# Patient Record
Sex: MALE | Race: BLACK OR AFRICAN AMERICAN
[De-identification: names, ages, dates, MRNs, and addresses within clinical notes are randomized per-mention and may not be internally consistent; named-entity substitution may affect disease eponyms.]

---

## 2020-12-19 ENCOUNTER — HOSPITAL ENCOUNTER (EMERGENCY)
Dept: HOSPITAL 75 - ER | Age: 45
Discharge: HOME | End: 2020-12-19
Payer: SELF-PAY

## 2020-12-19 VITALS — HEIGHT: 68.9 IN | BODY MASS INDEX: 46.65 KG/M2 | WEIGHT: 315 LBS

## 2020-12-19 VITALS — SYSTOLIC BLOOD PRESSURE: 139 MMHG | DIASTOLIC BLOOD PRESSURE: 67 MMHG

## 2020-12-19 DIAGNOSIS — I10: ICD-10-CM

## 2020-12-19 DIAGNOSIS — F17.200: ICD-10-CM

## 2020-12-19 DIAGNOSIS — N50.89: Primary | ICD-10-CM

## 2020-12-19 DIAGNOSIS — E66.9: ICD-10-CM

## 2020-12-19 LAB
APTT PPP: YELLOW S
BACTERIA #/AREA URNS HPF: NEGATIVE /HPF
BILIRUB UR QL STRIP: NEGATIVE
FIBRINOGEN PPP-MCNC: CLEAR MG/DL
GLUCOSE UR STRIP-MCNC: NEGATIVE MG/DL
KETONES UR QL STRIP: NEGATIVE
LEUKOCYTE ESTERASE UR QL STRIP: NEGATIVE
NITRITE UR QL STRIP: NEGATIVE
PH UR STRIP: 6.5 [PH] (ref 5–9)
PROT UR QL STRIP: NEGATIVE
RBC #/AREA URNS HPF: (no result) /HPF
SP GR UR STRIP: 1.02 (ref 1.02–1.02)
SQUAMOUS #/AREA URNS HPF: (no result) /HPF
WBC #/AREA URNS HPF: (no result) /HPF

## 2020-12-19 PROCEDURE — 99284 EMERGENCY DEPT VISIT MOD MDM: CPT

## 2020-12-19 PROCEDURE — 81000 URINALYSIS NONAUTO W/SCOPE: CPT

## 2020-12-19 PROCEDURE — 86780 TREPONEMA PALLIDUM: CPT

## 2020-12-19 PROCEDURE — 87491 CHLMYD TRACH DNA AMP PROBE: CPT

## 2020-12-19 PROCEDURE — 36415 COLL VENOUS BLD VENIPUNCTURE: CPT

## 2020-12-19 PROCEDURE — 87591 N.GONORRHOEAE DNA AMP PROB: CPT

## 2020-12-19 NOTE — ED GU-MALE
General


Chief Complaint:  Cough/Cold/Flu Symptoms


Stated Complaint:  COUGH


Source:  patient


Exam Limitations:  no limitations





History of Present Illness


Date Seen by Provider:  Dec 19, 2020


Time Seen by Provider:  14:40


Initial Comments


The patient presents to the ER by private conveyance with chief complaint that 

he woke up yesterday with a right swollen testicle.  He says is happened once 

before with trauma when he was a child.  He denies ever having STDs.  No dysuria

discharge or lesions.  No fevers chills shortness of air nausea vomiting loss of

sense of taste or smell, diarrhea constipation or abdominal pain.  No sick 

contacts.  He just got out of [facility] a couple days ago.  He does have a history 

of hypertension for which he is on 4 different blood pressure medicines.  He 

remembers lisinopril and amlodipine.  He has not establish care with a primary 

care physician yet.  He is sexually active although he says has been a minute.  

He prefers the company of females.  He does claim to have an occasional dry 

nonproductive cough that he attributes to smoking it is no worse than normal.





Allergies and Home Medications


Allergies


Coded Allergies:  


     No Known Drug Allergies (Unverified , 12/19/20)





Home Medications


Amlodipine Besylate 5 Mg Tablet, 5 MG PO DAILY


   Prescribed by: WILLIAM PALENCIA on 12/19/20 1522


Doxycycline Hyclate 100 Mg Tablet, 100 MG PO BID


   Prescribed by: WILLIAM PALENCIA on 12/19/20 1522


Hydrocodone/Acetaminophen 1 Each Tablet, 1 EACH PO Q6H PRN for PAIN-BREAKTHROUGH


   Prescribed by: WILLIAM PALENCIA on 12/19/20 1523





Patient Home Medication List


Home Medication List Reviewed:  Yes





Review of Systems


Review of Systems


Constitutional:  No chills, No diaphoresis


EENTM:  No ear pain, No eye pain


Respiratory:  No cough, No short of breath


Cardiovascular:  see HPI; No edema, No Hx of Intervention, No palpitations


Gastrointestinal:  No abdominal pain, No nausea, No vomiting


Genitourinary:  see HPI; denies burning, denies discharge, denies dysuria; pain 

(r teste)





All Other Systemes Reviewed


Negative Unless Noted:  Yes





Past Medical-Social-Family Hx


Patient Social History


Alcohol Use:  Denies Use


Recreational Drug Use:  No


Smoking Status:  Current Everyday Smoker





Physical Exam


Vital Signs





Vital Signs - First Documented








 12/19/20





 15:19


 


Temp 36.7


 


Pulse 96


 


Resp 14


 


B/P (MAP) 139/67 (91)


 


Pulse Ox 98


 


O2 Delivery Room Air





Capillary Refill :


Height, Weight, BMI


Height: '"


Weight: lbs. oz. kg;  BMI


Method:


General Appearance:  no apparent distress, obese


HEENT:  PERRL/EOMI, pharynx normal


Neck:  non-tender, full range of motion


Cardiovascular:  normal peripheral pulses, regular rate, rhythm


Respiratory:  lungs clear, normal breath sounds, no respiratory distress, no 

accessory muscle use


Gastrointestinal:  non tender, soft


Male:  testicular tenderness (Right enlarged approximately the size of a 

orange), other (No lesions, discharge, tenderness in the left testicle or 

penis.)


Neurologic/Psychiatric:  alert, normal mood/affect, oriented x 3


Skin:  normal color, warm/dry





Progress/Results/Core Measures


Suspected Sepsis


SIRS


Temperature: 


Pulse:  


Respiratory Rate: 


 


Blood Pressure  / 


Mean:





Results/Orders


Lab Results





Laboratory Tests








Test


 12/19/20


14:53 12/19/20


15:02 Range/Units


 


 


Urine Color YELLOW    


 


Urine Clarity CLEAR    


 


Urine pH 6.5   5-9  


 


Urine Specific Gravity 1.020   1.016-1.022  


 


Urine Protein NEGATIVE   NEGATIVE  


 


Urine Glucose (UA) NEGATIVE   NEGATIVE  


 


Urine Ketones NEGATIVE   NEGATIVE  


 


Urine Nitrite NEGATIVE   NEGATIVE  


 


Urine Bilirubin NEGATIVE   NEGATIVE  


 


Urine Urobilinogen 0.2   < = 1.0  MG/DL


 


Urine Leukocyte Esterase NEGATIVE   NEGATIVE  


 


Urine RBC (Auto) NEGATIVE   NEGATIVE  


 


Urine RBC NONE    /HPF


 


Urine WBC NONE    /HPF


 


Urine Squamous Epithelial


Cells 5-10 


 


  /HPF





 


Urine Crystals NONE    /LPF


 


Urine Bacteria NEGATIVE    /HPF


 


Urine Casts NONE    /LPF


 


Urine Mucus NEGATIVE    /LPF


 


Urine Culture Indicated NO    








My Orders





Orders - WILLIAM PALENCIA


Ceftriaxone For Im Use (Rocephin For Im (12/19/20 15:00)


Azithromycin Tablet (Zithromax Tablet) (12/19/20 15:00)


Lidocaine 1% Inj 20 Ml (Xylocaine 1% Inj (12/19/20 15:00)


Chlamydia Trachomatis Urine (12/19/20 14:59)


Neis Hemant Dna Urine Test (12/19/20 14:59)


Ua Culture If Indicated (12/19/20 14:59)


Syphilis Antibody Screen (12/19/20 15:02)





Medications Given in ED





Current Medications








 Medications  Dose


 Ordered  Sig/Luis


 Route  Start Time


 Stop Time Status Last Admin


Dose Admin


 


 Azithromycin  1,000 mg  ONCE  ONCE


 PO  12/19/20 15:00


 12/19/20 15:01 DC 12/19/20 15:57


1,000 MG


 


 Ceftriaxone Sodium  500 mg  ONCE  ONCE


 IM  12/19/20 15:00


 12/19/20 15:01 DC 12/19/20 15:57


500 MG


 


 Lidocaine HCl  1 ml  ONCE  ONCE


 INJ  12/19/20 15:00


 12/19/20 15:01 DC 12/19/20 15:57


1 ML








Vital Signs/I&O











 12/19/20





 15:19


 


Temp 36.7


 


Pulse 96


 


Resp 14


 


B/P (MAP) 139/67 (91)


 


Pulse Ox 98


 


O2 Delivery Room Air





Capillary Refill :


Progress Note :  


   Time:  15:09


Progress Note


We offered a COVID-19 swab and he declined.  He does not seem to be having any 

acute respiratory issues at this time.  We will obtain urine, STD testing and 

give him a dose of Rocephin, azithromycin and put him out on doxycycline for 

possible orchitis.  We offered to transfer him to a facility that had ultrasound

available today and encouraged him to go but he declined.  We will provide him 

with a opportunity to get an ultrasound Monday outpatient and he is okay with 

that plan.  We also offered to refill his amlodipine although initial blood 

pressure of 130s over 60s is acceptable control.  We will give him a list of the

local physicians.





Departure


Impression





   Primary Impression:  


   Swollen testicle


   Additional Impression:  


   Hypertension


   Qualified Codes:  I10 - Essential (primary) hypertension


Disposition:  01 HOME, SELF-CARE


Condition:  Stable





Departure-Patient Inst.


Decision time for Depature:  16:10


Referrals:  


NO,LOCAL PHYSICIAN (PCP/Family)


Primary Care Physician


Patient Instructions:  Epididymitis (DC), LOCAL PHYSICIAN LIST





Add. Discharge Instructions:  


Drink plenty of fluids.


Warm moist heat may be helpful.


Tylenol 1000 mg every 8 hours as necessary for pain.


Ibuprofen 800 mg every 8 hours as necessary for pain.


Hydrocodone 1 tablet every 6 hours as necessary for severe pain.


Hydrocodone will cause constipation and drowsiness.


Doxycycline 1 tablet twice a day for the next 10 days.


It is imperative that you call Monday and schedule an ultrasound of the 

testicle.


Return to the ER if you are having intractable pain, fever or nausea.


Over the next 2 to 3 days someone will call you if your lab tests are positive. 

Continue taking the antibiotics.


All discharge instructions reviewed with patient and/or family. Voiced 

understanding.


Scripts


Doxycycline Hyclate (Doxycycline Hyclate) 100 Mg Tablet


100 MG PO BID for 10 Days, #20 TAB 0 Refills


   Prov: WILLIAM PALENCIA         12/19/20 


Amlodipine Besylate (Amlodipine Besylate) 5 Mg Tablet


5 MG PO DAILY for 14 Days, #14 TAB 0 Refills


   Prov: WILLIAM PALENCIA         12/19/20 


Hydrocodone/Acetaminophen (Hydrocodone-Acetamin 5-325 mg) 1 Each Tablet


1 EACH PO Q6H PRN for PAIN-BREAKTHROUGH, #10 TAB 0 Refills


   Prov: WILLIAM PALENCIA         12/19/20











WILLIAM PALENCIA                 Dec 19, 2020 15:11

## 2020-12-21 ENCOUNTER — HOSPITAL ENCOUNTER (OUTPATIENT)
Dept: HOSPITAL 75 - RAD | Age: 45
End: 2020-12-21
Attending: EMERGENCY MEDICINE
Payer: SELF-PAY

## 2020-12-21 DIAGNOSIS — N50.89: ICD-10-CM

## 2020-12-21 DIAGNOSIS — N43.3: Primary | ICD-10-CM

## 2020-12-21 PROCEDURE — 76870 US EXAM SCROTUM: CPT

## 2020-12-21 NOTE — DIAGNOSTIC IMAGING REPORT
PROCEDURE: US Scrotum w/ Duplex



TECHNIQUE: Multiple realtime gray images were obtained of the

scrotum in various projections bilaterally. Color Doppler images

were also obtained.



INDICATION: Right testicular swelling.



COMPARISON: None.



FINDINGS: The testicles are normal in size, shape and

echogenicity. The right testis measures 4.0 x 2.6 x 2.7 cm. The

left testis measures 4.6 x 2.1 x 2.4 cm. There is normal color

flow Doppler signal of both testicles. No focal testicular mass

is seen on either side. The right and left epididymides are

unremarkable.  There is no sonographic evidence of epididymitis.

A large hydrocele is seen on the right.



IMPRESSION:

1. Large right-sided hydrocele, likely representing the patient's

history of right testicular swelling.

2. Unremarkable sonographic appearance of the testicles. No

evidence of focal mass or torsion.



Dictated by: 



  Dictated on workstation # [id number]

## 2020-12-24 ENCOUNTER — HOSPITAL ENCOUNTER (EMERGENCY)
Dept: HOSPITAL 75 - ER | Age: 45
Discharge: HOME | End: 2020-12-24
Payer: SELF-PAY

## 2020-12-24 VITALS — SYSTOLIC BLOOD PRESSURE: 185 MMHG | DIASTOLIC BLOOD PRESSURE: 102 MMHG

## 2020-12-24 VITALS — HEIGHT: 69.29 IN | BODY MASS INDEX: 46.13 KG/M2 | WEIGHT: 315 LBS

## 2020-12-24 DIAGNOSIS — I10: ICD-10-CM

## 2020-12-24 DIAGNOSIS — N43.3: Primary | ICD-10-CM

## 2020-12-24 PROCEDURE — 99281 EMR DPT VST MAYX REQ PHY/QHP: CPT

## 2020-12-24 NOTE — ED GU-MALE
General


Stated Complaint:  TESTICULAR SWELLING


Source:  patient


Exam Limitations:  no limitations





History of Present Illness


Date Seen by Provider:  Dec 24, 2020


Time Seen by Provider:  14:53


Initial Comments


Patient is a 45-year-old male who presents to the emergency department today 

with a chief complaint of significant scrotal swelling.  Patient was seen here 

in the emergency department 1 week ago and had an outpatient ultrasound 

performed on 21 December.  This ultrasound showed a right-sided hydrocele very 

large with Doppler signal to both testicles no evidence of testicular mass and 

normal epididymides bilaterally.  This hydrocele is primarily right-sided.  

Patient states that he is not really having significant worsening of his pain 

but it is quite uncomfortable.  He states he has been wearing briefs style 

underwear.  He is not been taking any NSAIDs for discomfort.  Patient states he 

has been using ice to the groin.  He is currently finishing up a prescription of

doxycycline.





No other complaints of illness or injury.





All other review of systems reviewed and negative except as stated.


Timing/Duration:  getting worse


Severity/Quality:  moderate


Location:  scrotal


Radiation:  none


Activities at Onset:  none


Prior Genitourinary Problems:  none


Sexual [place] History:  not active


Associated Symptoms:  denies symptoms





Allergies and Home Medications


Allergies


Coded Allergies:  


     No Known Drug Allergies (Unverified , 12/19/20)





Home Medications


Amlodipine Besylate 5 Mg Tablet, 5 MG PO DAILY


   Prescribed by: WILLIAM PALENCIA on 12/19/20 1522


Doxycycline Hyclate 100 Mg Tablet, 100 MG PO BID


   Prescribed by: WILLIAM PALENCIA on 12/19/20 1522


Hydrocodone/Acetaminophen 1 Each Tablet, 1 EACH PO Q6H PRN for PAIN-BREAKTHROUGH


   Prescribed by: WILLIAM PALENCIA on 12/19/20 1523





Patient Home Medication List


Home Medication List Reviewed:  Yes





Review of Systems


Review of Systems


Constitutional:  see HPI


EENTM:  no symptoms reported


Respiratory:  no symptoms reported


Cardiovascular:  no symptoms reported


Gastrointestinal:  no symptoms reported


Genitourinary:  other (Scrotal swelling)


Musculoskeletal:  no symptoms reported


Skin:  no symptoms reported





Past Medical-Social-Family Hx


Patient Social History


Type Used:  Cigarettes


Recent Foreign Travel:  No


Contact w/Someone Who Travel:  No


Recent Hopitalizations:  No





Seasonal Allergies


Seasonal Allergies:  No





Past Medical History


Surgeries:  No


Respiratory:  No


Cardiac:  Yes


Hypertension


Neurological:  No


Genitourinary:  No


Gastrointestinal:  No


Musculoskeletal:  No


Endocrine:  No


HEENT:  No


Cancer:  No


Psychosocial:  No


Integumentary:  No


Blood Disorders:  No





Physical Exam


Vital Signs


Capillary Refill :


Height, Weight, BMI


Height: '"


Weight: lbs. oz. kg; 65.00 BMI


Method:


General Appearance:  WD/WN, no apparent distress


Cardiovascular:  regular rate, rhythm


Respiratory:  lungs clear, normal breath sounds


Gastrointestinal:  non tender, soft


Male:  normal genitalia, other (Significantly swollen scrotum, it is difficult 

to palpate either testes secondary to the amount of edema present; no 

significant tenderness on palpation)


Extremities:  non-tender, normal inspection, no pedal edema


Neurologic/Psychiatric:  alert, normal mood/affect


Skin:  normal color, warm/dry





Progress/Results/Core Measures


Suspected Sepsis


SIRS


Temperature: 


Pulse:  


Respiratory Rate: 


 


Blood Pressure  / 


Mean:





Results/Orders


Vital Signs/I&O


Capillary Refill :


Progress Note :  


   Time:  15:10


Progress Note


Discussed with Dr. Tawill; he recommends another ultrasound to further evaluate 

the testicles; he did say that I could offer aspiration of the hydrocele to the 

patient.  The patient respectfully declined.  He also does not want to be sent 

to another facility for an testicular ultrasound today.  He prefers to wait for 

another couple of days and see what happens.  The patient has not been on NSAIDs

I recommended that he start taking these.  I am going to give him contact 

information for urology for Monday to call for an appointment.  The patient is 

comfortable with this plan of care.  I told him if things worsen over the course

of the next 24 to 48 hours to please come back to the emergency room or make his

way to [place] to either [facility] or [place] as they have 

ultrasound 24 hours a day.  He verbalizes understanding of this, all questions 

are sought and answered.  Patient is stable for discharge.





Departure


Communication (Admissions)


Time/Spoke to Consulting Phy:  15:00


Discussed with Dr. Tawill; will see as an outpatient





Impression





   Primary Impression:  


   Hydrocele in adult


Disposition:  01 HOME, SELF-CARE


Condition:  Stable (ERASED)





Departure-Patient Inst.


Decision time for Depature:  15:09


Referrals:  


NO,LOCAL PHYSICIAN (PCP)


Primary Care Physician








TAWIL,ELIAS A MD


Patient Instructions:  Hydrocele





Add. Discharge Instructions:  


Take over-the-counter Aleve, 2 pills in the morning with food and 2 pills at 

night with food.  Continue to wear tight briefs underwear.





You can apply ice packs for 20 minutes at a time 3-4 times a day as well.





Finish off your antibiotics as prescribed.





I have given you an outpatient order for a repeat ultrasound on Monday here at 

this facility.  But should you have any significant worsening over the next few 

days please come back to the emergency room for reevaluation and transfer to a 

center that has ultrasound capabilities and urology.











NATASHA TRAMMELL MD         Dec 24, 2020 15:00

## 2022-11-27 ENCOUNTER — HOSPITAL ENCOUNTER (INPATIENT)
Dept: HOSPITAL 75 - ER | Age: 47
LOS: 3 days | Discharge: HOME | DRG: 291 | End: 2022-11-30
Attending: INTERNAL MEDICINE | Admitting: INTERNAL MEDICINE
Payer: SELF-PAY

## 2022-11-27 VITALS — WEIGHT: 315 LBS | HEIGHT: 66.97 IN | BODY MASS INDEX: 49.44 KG/M2

## 2022-11-27 VITALS — SYSTOLIC BLOOD PRESSURE: 155 MMHG | DIASTOLIC BLOOD PRESSURE: 87 MMHG

## 2022-11-27 VITALS — DIASTOLIC BLOOD PRESSURE: 94 MMHG | SYSTOLIC BLOOD PRESSURE: 157 MMHG

## 2022-11-27 DIAGNOSIS — J96.01: ICD-10-CM

## 2022-11-27 DIAGNOSIS — E87.29: ICD-10-CM

## 2022-11-27 DIAGNOSIS — Z20.822: ICD-10-CM

## 2022-11-27 DIAGNOSIS — I11.0: Primary | ICD-10-CM

## 2022-11-27 DIAGNOSIS — N17.9: ICD-10-CM

## 2022-11-27 DIAGNOSIS — Z91.199: ICD-10-CM

## 2022-11-27 DIAGNOSIS — E66.2: ICD-10-CM

## 2022-11-27 DIAGNOSIS — J44.9: ICD-10-CM

## 2022-11-27 DIAGNOSIS — F17.210: ICD-10-CM

## 2022-11-27 DIAGNOSIS — R73.9: ICD-10-CM

## 2022-11-27 DIAGNOSIS — I50.31: ICD-10-CM

## 2022-11-27 DIAGNOSIS — J96.02: ICD-10-CM

## 2022-11-27 LAB
ALBUMIN SERPL-MCNC: 3.9 GM/DL (ref 3.2–4.5)
ALP SERPL-CCNC: 86 U/L (ref 40–136)
ALT SERPL-CCNC: 40 U/L (ref 0–55)
APTT BLD: 29 SEC (ref 24–35)
APTT PPP: YELLOW S
ARTERIAL PATENCY WRIST A: (no result)
BACTERIA #/AREA URNS HPF: (no result) /HPF
BASE EXCESS STD BLDA CALC-SCNC: 4.1 MMOL/L (ref -2.5–2.5)
BASOPHILS # BLD AUTO: 0 10^3/UL (ref 0–0.1)
BASOPHILS NFR BLD AUTO: 0 % (ref 0–10)
BDY SITE: (no result)
BILIRUB SERPL-MCNC: 1 MG/DL (ref 0.1–1)
BILIRUB UR QL STRIP: NEGATIVE
BODY TEMPERATURE: 36.6
BUN/CREAT SERPL: 9
CALCIUM SERPL-MCNC: 8.7 MG/DL (ref 8.5–10.1)
CHLORIDE SERPL-SCNC: 105 MMOL/L (ref 98–107)
CO2 BLDA CALC-SCNC: 31.4 MMOL/L (ref 21–31)
CO2 SERPL-SCNC: 25 MMOL/L (ref 21–32)
CREAT SERPL-MCNC: 1.28 MG/DL (ref 0.6–1.3)
EOSINOPHIL # BLD AUTO: 0.3 10^3/UL (ref 0–0.3)
EOSINOPHIL NFR BLD AUTO: 4 % (ref 0–10)
FIBRINOGEN PPP-MCNC: CLEAR MG/DL
GFR SERPLBLD BASED ON 1.73 SQ M-ARVRAT: 69 ML/MIN
GLUCOSE SERPL-MCNC: 170 MG/DL (ref 70–105)
GLUCOSE UR STRIP-MCNC: (no result) MG/DL
HCT VFR BLD CALC: 45 % (ref 40–54)
HGB BLD-MCNC: 15.4 G/DL (ref 13.3–17.7)
INHALED O2 FLOW RATE: (no result) L/MIN
INR PPP: 1 (ref 0.8–1.4)
KETONES UR QL STRIP: NEGATIVE
LEUKOCYTE ESTERASE UR QL STRIP: NEGATIVE
LYMPHOCYTES # BLD AUTO: 1 10^3/UL (ref 1–4)
LYMPHOCYTES NFR BLD AUTO: 15 % (ref 12–44)
MANUAL DIFFERENTIAL PERFORMED BLD QL: NO
MCH RBC QN AUTO: 31 PG (ref 25–34)
MCHC RBC AUTO-ENTMCNC: 34 G/DL (ref 32–36)
MCV RBC AUTO: 91 FL (ref 80–99)
MONOCYTES # BLD AUTO: 0.8 10^3/UL (ref 0–1)
MONOCYTES NFR BLD AUTO: 12 % (ref 0–12)
NEUTROPHILS # BLD AUTO: 4.6 10^3/UL (ref 1.8–7.8)
NEUTROPHILS NFR BLD AUTO: 69 % (ref 42–75)
NITRITE UR QL STRIP: NEGATIVE
PCO2 BLDA: 57 MMHG (ref 35–45)
PH BLDA: 7.34 [PH] (ref 7.37–7.43)
PH UR STRIP: 5.5 [PH] (ref 5–9)
PLATELET # BLD: 171 10^3/UL (ref 130–400)
PMV BLD AUTO: 10.1 FL (ref 9–12.2)
PO2 BLDA: 61 MMHG (ref 79–93)
POTASSIUM SERPL-SCNC: 3.5 MMOL/L (ref 3.6–5)
PROT SERPL-MCNC: 7.3 GM/DL (ref 6.4–8.2)
PROT UR QL STRIP: (no result)
PROTHROMBIN TIME: 13.4 SEC (ref 12.2–14.7)
RBC #/AREA URNS HPF: (no result) /HPF
SAO2 % BLDA FROM PO2: 94 % (ref 94–100)
SODIUM SERPL-SCNC: 143 MMOL/L (ref 135–145)
SP GR UR STRIP: >=1.03 (ref 1.02–1.02)
SQUAMOUS #/AREA URNS HPF: (no result) /HPF
VENTILATION MODE VENT: NO
WBC # BLD AUTO: 6.7 10^3/UL (ref 4.3–11)
WBC #/AREA URNS HPF: (no result) /HPF

## 2022-11-27 PROCEDURE — 94761 N-INVAS EAR/PLS OXIMETRY MLT: CPT

## 2022-11-27 PROCEDURE — 85025 COMPLETE CBC W/AUTO DIFF WBC: CPT

## 2022-11-27 PROCEDURE — 87636 SARSCOV2 & INF A&B AMP PRB: CPT

## 2022-11-27 PROCEDURE — 93005 ELECTROCARDIOGRAM TRACING: CPT

## 2022-11-27 PROCEDURE — 83735 ASSAY OF MAGNESIUM: CPT

## 2022-11-27 PROCEDURE — 85730 THROMBOPLASTIN TIME PARTIAL: CPT

## 2022-11-27 PROCEDURE — 82947 ASSAY GLUCOSE BLOOD QUANT: CPT

## 2022-11-27 PROCEDURE — 87081 CULTURE SCREEN ONLY: CPT

## 2022-11-27 PROCEDURE — 87070 CULTURE OTHR SPECIMN AEROBIC: CPT

## 2022-11-27 PROCEDURE — 83880 ASSAY OF NATRIURETIC PEPTIDE: CPT

## 2022-11-27 PROCEDURE — 83036 HEMOGLOBIN GLYCOSYLATED A1C: CPT

## 2022-11-27 PROCEDURE — 94760 N-INVAS EAR/PLS OXIMETRY 1: CPT

## 2022-11-27 PROCEDURE — 85007 BL SMEAR W/DIFF WBC COUNT: CPT

## 2022-11-27 PROCEDURE — 82805 BLOOD GASES W/O2 SATURATION: CPT

## 2022-11-27 PROCEDURE — 36415 COLL VENOUS BLD VENIPUNCTURE: CPT

## 2022-11-27 PROCEDURE — 80048 BASIC METABOLIC PNL TOTAL CA: CPT

## 2022-11-27 PROCEDURE — 84100 ASSAY OF PHOSPHORUS: CPT

## 2022-11-27 PROCEDURE — 93306 TTE W/DOPPLER COMPLETE: CPT

## 2022-11-27 PROCEDURE — 83615 LACTATE (LD) (LDH) ENZYME: CPT

## 2022-11-27 PROCEDURE — 71045 X-RAY EXAM CHEST 1 VIEW: CPT

## 2022-11-27 PROCEDURE — 86141 C-REACTIVE PROTEIN HS: CPT

## 2022-11-27 PROCEDURE — 80053 COMPREHEN METABOLIC PANEL: CPT

## 2022-11-27 PROCEDURE — 94640 AIRWAY INHALATION TREATMENT: CPT

## 2022-11-27 PROCEDURE — 87205 SMEAR GRAM STAIN: CPT

## 2022-11-27 PROCEDURE — 85027 COMPLETE CBC AUTOMATED: CPT

## 2022-11-27 PROCEDURE — 94660 CPAP INITIATION&MGMT: CPT

## 2022-11-27 PROCEDURE — 85610 PROTHROMBIN TIME: CPT

## 2022-11-27 PROCEDURE — 87088 URINE BACTERIA CULTURE: CPT

## 2022-11-27 PROCEDURE — 84484 ASSAY OF TROPONIN QUANT: CPT

## 2022-11-27 PROCEDURE — 81000 URINALYSIS NONAUTO W/SCOPE: CPT

## 2022-11-27 PROCEDURE — 80061 LIPID PANEL: CPT

## 2022-11-27 PROCEDURE — 84145 PROCALCITONIN (PCT): CPT

## 2022-11-27 RX ADMIN — DOCUSATE SODIUM SCH MG: 100 CAPSULE ORAL at 20:02

## 2022-11-27 RX ADMIN — ALBUTEROL SULFATE SCH GM: 90 AEROSOL, METERED RESPIRATORY (INHALATION) at 18:29

## 2022-11-27 RX ADMIN — HYDROCODONE BITARTRATE AND ACETAMINOPHEN PRN EA: 5; 325 TABLET ORAL at 20:36

## 2022-11-27 RX ADMIN — ALBUTEROL SULFATE SCH PUFF: 90 AEROSOL, METERED RESPIRATORY (INHALATION) at 22:56

## 2022-11-27 RX ADMIN — ENOXAPARIN SODIUM SCH MG: 100 INJECTION SUBCUTANEOUS at 17:56

## 2022-11-27 RX ADMIN — HYDRALAZINE HYDROCHLORIDE PRN MG: 20 INJECTION INTRAMUSCULAR; INTRAVENOUS at 17:56

## 2022-11-27 RX ADMIN — INSULIN ASPART SCH UNIT: 100 INJECTION, SOLUTION INTRAVENOUS; SUBCUTANEOUS at 20:40

## 2022-11-27 RX ADMIN — INSULIN ASPART SCH UNIT: 100 INJECTION, SOLUTION INTRAVENOUS; SUBCUTANEOUS at 17:55

## 2022-11-27 RX ADMIN — SENNOSIDES SCH MG: 8.6 TABLET, FILM COATED ORAL at 20:03

## 2022-11-27 RX ADMIN — METHYLPREDNISOLONE SODIUM SUCCINATE SCH MG: 40 INJECTION, POWDER, FOR SOLUTION INTRAMUSCULAR; INTRAVENOUS at 17:56

## 2022-11-27 RX ADMIN — ALBUTEROL SULFATE SCH GM: 90 AEROSOL, METERED RESPIRATORY (INHALATION) at 12:19

## 2022-11-27 NOTE — HISTORY & PHYSICAL-HOSPITALIST
History of Present Illness


HPI/Chief Complaint


August Wang is a 47 year old male who presented with shortness of breath. He 

has been feeling sick for the past month. He has been getting more short of 

breath recently. He has sweling in his legs which moves up to his abdomen. He 

denies fevers. He denies chest pain. He denies nausea and vomiting. He does not 

have a primary care doctor. He has a history of VI but is not compliant with 

CPAP.


Source:  patient


Exam Limitations:  no limitations


Date Seen


11/27/22


Time Seen by a Provider:  12:30


Attending Physician


No,Local Physician


PCP


Admitting Physician:


Nicolasa Rodriguez MD 








Attending Physician:


Nicolasa Rodriguez MD


Referring Physician





Date of Admission


Nov 27, 2022 at 14:15





Home Medications & Allergies


Home Medications


Reviewed patient Home Medication Reconciliation performed by pharmacy medication

reconciliations technician and/or nursing.


Patients Allergies have been reviewed.





Allergies





Allergies


Coded Allergies


  No Known Drug Allergies ([id number]/27/22)








Past Medical-Social-Family Hx


Patient Social History


Tobacco Use?:  Yes


Tobacco type used:  Cigarettes


Smoking Status:  Current Everyday Smoker


Use of E-Cig and/or Vaping dev:  No


Substance use?:  No


Alcohol Use?:  No


Pt feels they are or have been:  No





Immunizations Up To Date


First/Initial COVID19 Vaccinat:  NOT VACCINATED





Seasonal Allergies


Seasonal Allergies:  No





Current Status


Advance Directives:  No


Communicates:  Verbally


Primary Language:  English


Preferred Spoken Language:  English


Is interpretation needed?:  No


Implanted or Applied Medical D:  None





Past Medical History


Hypertension


Blood Disorders:  No





Family Medical History


Reviewed Nursing Family Hx


No Pertinent Family Hx





Review of Systems


Constitutional:  no symptoms reported


EENTM:  no symptoms reported


Respiratory:  dyspnea on exertion, short of breath, wheezing


Cardiovascular:  no symptoms reported


Gastrointestinal:  no symptoms reported





Physical Exam


Physical Exam


Vital Signs





Vital Signs - First Documented








 11/27/22





 11:42


 


Temp 36.3


 


Pulse 112


 


Resp 22


 


B/P (MAP) 146/92 (110)


 


Pulse Ox 93


 


O2 Delivery Room Air


 


O2 Flow Rate 5.00





Capillary Refill :


Height, Weight, BMI


Height: '"


Weight: lbs. oz. kg; 54.84 BMI


Method:


General Appearance:  Moderate Distress (tachypnea), Obese


HEENT:  PERRL/EOMI, Pharynx Normal


Neck:  Normal Inspection, Supple


Respiratory:  Decreased Breath Sounds, Respiratory Distress (tachypnea), 

Wheezing


Cardiovascular:  No Murmur, Tachycardia


Gastrointestinal:  Normal Bowel Sounds, Non Tender, Soft, Other (abdominal wall 

edema)


Extremity:  Non Tender, Pedal Edema, Swelling


Neurologic/Psychiatric:  Alert, Motor Weakness


Skin:  Normal Color, Warm/Dry





Results


Results/Procedures


Labs


Laboratory Tests


11/27/22 11:50








Patient resulted labs reviewed.


Imaging:  Reviewed Imaging Films, Reviewed Imaging Report





Assessment/Plan


Admission Diagnosis


Acute respiratory failure with hypoxia and hypercapnia


Admission Status:  Inpatient Order (span 2 midnights)


Reason for Inpatient Admission:  


Respiratory failure





Assessment and Plan


Acute respiratory failure with hypoxia and hypercapnia


CHF


COPD


HTN


Super obesity


   CXR with pulmonary edema


   Lasix


   Echo ordered


   BiPAP at night


   Steroids


   MAT protocol


   Supplemental oxygen as needed


   Hydralazine as needed





Hyperglycemia


   A1C ordered


   Sliding scale insulin





DVT prophylaxis: Lovenox





Critical Care


Critically Ill Patient





Diagnosis/Problems


Diagnosis/Problems





(1) Acute respiratory failure with hypoxia and hypercapnia


Status:  Acute


(2) CHF (congestive heart failure)


Status:  Acute


(3) COPD (chronic obstructive pulmonary disease)


Status:  Acute


(4) Super obesity


Status:  Chronic


(5) HTN (hypertension)


Status:  Acute


(6) Hyperglycemia


Status:  Acute


(7) Sleep apnea


Status:  Acute


Qualifiers:  


   Sleep apnea type:  unspecified type  Qualified Codes:  G47.30 - Sleep apnea, 

unspecified











NICOLASA RODRIGUEZ MD              Nov 27, 2022 21:04

## 2022-11-27 NOTE — TELE-ICU CONSULT
History of Present Illness


History of Present Illness


Date Seen by Provider:  Nov 27, 2022


Time Seen by Provider:  15:32


Date of Admission


(Tele-ICU Physician ,   consultation as per request of PCP 


Service provided via interactive audio and video telecommunSiamosoci  E-CARE 

system to a patient admitted to ICU bed in [facility].








Available chart/ vitals / labs / Images reviewed


H&P is from ER notes


Patient's information available about PMH, Shx, Fhx   allergy reviewed inEMR.


ROS as per chart and RN report





Now in ICU, hemodynamically stable


Video assessment done using   teleICU camera, rest of exam as per RN


Discussed with RN.





Consultants:


Hospital course:








A/P





HYpoxia on admission and dyspnea


- presumed CHF 


- responded to lasix well 


- wheezing reported in ER  - as per chart , started on steroids 





Hypertension 


 - BP in ER 230s/140- improved 





Reported h/o VI


mild resp acidosis on admission 


 - follow closely , CPAP noctrnal 








Lines :      , (Central Line Necessity Reviewed)


Mora:


OG:


Nutrition:


Analgesia:


Anxiety/ delirium





VTE Prophylaxis: lov 60 q12 


Stress Ulcer Prophylaxis:





Plans in collaboration with   bedside consultants and IM MDs.


Discussed with RN to reach out if any questions or concerns


A total of  20 minutes of critical care time was devoted to this patient today, 

required to treat and/or prevent further deterioration of  critical care 

condition ( as above ) .





I am remotely monitoring this patient from another state.  I am unable to do the

bedside exam, and history/physical and pertinent information is taken from other

notes in the computer and bedside staff. .





Allergies and Home Medications


Allergies


Coded Allergies:  


     No Known Drug Allergies (Unverified , 11/27/22)





Home Medications


Amlodipine Besylate 5 Mg Tablet, 5 MG PO DAILY


   Prescribed by: WILLIAM PALENCIA on 12/19/20 1522


Doxycycline Hyclate 100 Mg Tablet, 100 MG PO BID


   Prescribed by: WILLIAM PALENCIA on 12/19/20 1522


Hydrocodone/Acetaminophen 1 Each Tablet, 1 EACH PO Q6H PRN for PAIN-BREAKTHROUGH


   Prescribed by: WILLIAM PALENCIA on 12/19/20 1523





Past Medical/Social/Family Hx


Patient Social History


Tobacco Use?:  Yes


Tobacco type used:  Cigarettes


Smoking Status:  Current Everyday Smoker


Use of E-Cig and/or Vaping dev:  No


Substance use?:  No


Alcohol Use?:  No


Pt stated abuse/neglect:  No





Immunizations Up To Date


Influenza Vaccine Up-to-Date:  No; Not Current


First/Initial COVID19 Vaccinat:  NOT VACCINATED





Current Status


Advance Directives:  No


Communicates:  Verbally


Primary Language:  English


Preferred Spoken Language:  English


Is interpretation needed?:  No


Implanted or Applied Medical D:  None





Review of Systems


Constitutional:  see HPI





Focused Exam


Height, Weight, BMI


Height: '"


Weight: lbs. oz. kg; 54.84 BMI


Method:





Exam


Exam


Patient acknowledged, consented, and participated in this virtual visit which 

was conducted using real time audio/video


Vital Signs








  Date Time  Temp Pulse Resp B/P (MAP) Pulse Ox O2 Delivery O2 Flow Rate FiO2


 


11/27/22 18:00  109 26  92 Nasal Cannula 2.00 


 


11/27/22 17:00  104 23 172/102 (125) 93 Nasal Cannula 2.00 


 


11/27/22 16:00 36.1       


 


11/27/22 16:00  107 18 146/71 (96) 92 Nasal Cannula 2.00 


 


11/27/22 15:21      Nasal Cannula 2.00 


 


11/27/22 15:07 36.3 107   97   


 


11/27/22 15:00  105 18 157/98 (117) 95 Nasal Cannula 2.00 


 


11/27/22 14:50  107      


 


11/27/22 14:49      Nasal Cannula 2.00 


 


11/27/22 14:16  112 16 155/87 97 Nasal Cannula 2.00 





       2.00 


 


11/27/22 13:33     92 Nasal Cannula 2.00 


 


11/27/22 13:01 36.3 112 22 156/104 (121) 93 Room Air 5.00 





       5.00 


 


11/27/22 11:42 36.3 112 22 146/92 (110) 93 OxyMask 5.00 


 


11/27/22 11:42      Room Air  








Height & Weight


Height: '"


Weight: lbs. oz. kg; 54.84 BMI


Method:


General Appearance:  No Apparent Distress


Gastrointestinal:  normal bowel sounds, non tender, soft, distended





Results


Lab


Laboratory Tests


11/27/22 11:50











Assessment/Plan


Assessment/Plan


1











NILTON FIGUEROA MD         Nov 27, 2022 18:31

## 2022-11-27 NOTE — DIAGNOSTIC IMAGING REPORT
EXAMINATION: Chest 1 view



HISTORY: Hypoxia, cough, SOA



COMPARISON: None available.



FINDINGS: 



Heart size and pulmonary vasculature are mildly enlarged. There

are diffuse interstitial opacities throughout both lungs. No

pleural effusion or pneumothorax. The osseous structures are

intact.



IMPRESSION: 



1. Cardiomegaly and pulmonary vasculature congestion with diffuse

interstitial opacities are both lungs. Findings can be seen with

pulmonary edema or atypical infection.



Dictated by: 



  Dictated on workstation # [id number]

## 2022-11-27 NOTE — ED RESPIRATORY
General


Chief Complaint:  Respiratory Problems


Stated Complaint:  DIFFICULTY BREATHING


Nursing Triage Note:  


COLD/COUGH FOR 3 WEEKS IN LAST 2 DAYS STARTED HAVING EXTREME SOB.





History of Present Illness


Date Seen by Provider:  2022


Time Seen by Provider:  11:40


Initial Comments


47-year-old -American male presents for shortness of breath and cough 

that has been occurring for the last 3 weeks.  He reports over the last 2 days 

he has had extreme shortness of breath.  He has a history of sleep apnea, no 

other reported medical conditions.  Denies chest pain, asthma or chronic 

respiratory problems.  He does not have a primary care provider.  He reports 

inability to sleep last night and did not use his CPAP.  He does have a produ

ctive cough.  His initial SaO2 on room air was 81%, attempted nasal cannula at 3

to 4 L with minimal improvement so converted to oxygen mask at 5 L with SaO2 93-

95%.  He denies having COVID or influenza vaccination.


Timing/Duration:  getting worse


Severity:  moderate


Prior Episodes/Possible Cause:  occasional episodes


Modifying Factors:  Improves With Coughing, Improves With Lying Down, Improves 

With Rest


Associated Symptoms:  No chest pain/soreness; cough (Productive); No dizziness, 

No earache, No facial pain, No fever/chills, No headache, No lightheadedness; 

muscle aches; No nasal congestion, No nasal drainage; shortness of breath; No 

sinus infection, No sore throat, No wheezing





Allergies and Home Medications


Allergies


Coded Allergies:  


     No Known Drug Allergies (Unverified , 22)





Patient Home Medication List


Home Medication List Reviewed:  Yes


Amlodipine Besylate (Amlodipine Besylate) 5 Mg Tablet, 5 MG PO DAILY


   Prescribed by: WILLIAM PALENCIA on 20


Doxycycline Hyclate (Doxycycline Hyclate) 100 Mg Tablet, 100 MG PO BID


   Prescribed by: WILLIAM PALENCIA on 20 152


Hydrocodone/Acetaminophen (Hydrocodone-Acetamin 5-325 mg) 1 Each Tablet, 1 EACH 

PO Q6H PRN for PAIN-BREAKTHROUGH


   Prescribed by: WILLIAM PALENCIA on 20 1523





Review of Systems


Review of Systems


Constitutional:  see HPI, malaise, weakness


EENTM:  see HPI, no symptoms reported


Respiratory:  see HPI, cough, dyspnea on exertion, phlegm, short of breath


Cardiovascular:  no symptoms reported, see HPI; No chest pain


Gastrointestinal:  no symptoms reported, see HPI; No abdominal pain, No 

constipation, No diarrhea; loss of appetite; No nausea, No vomiting


Genitourinary:  no symptoms reported, see HPI


Musculoskeletal:  no symptoms reported, see HPI


Skin:  no symptoms reported, see HPI





All Other Systems Reviewed


Negative Unless Noted:  Yes





Past Medical-Social-Family Hx


Patient Social History


Tobacco Use?:  Yes


Tobacco type used:  Cigarettes


Use of E-Cig and/or Vaping dev:  No


Substance use?:  No


Alcohol Use?:  No


Pt feels they are or have been:  No





Immunizations Up To Date


Influenza Vaccine Up-to-Date:  No; Not Current


First/Initial COVID19 Vaccinat:  NOT VACCINATED





Seasonal Allergies


Seasonal Allergies:  No





Past Medical History


Surgery/Hospitalization HX:  


SLEEP APNEA C PAP


Surgeries:  No


Respiratory:  No


Cardiac:  Yes


Hypertension


Neurological:  No


Genitourinary:  No


Gastrointestinal:  No


Musculoskeletal:  No


Endocrine:  No


HEENT:  No


Cancer:  No


Psychosocial:  No


Integumentary:  No


Blood Disorders:  No





Family Medical History


Reviewed Nursing Family Hx





Physical Exam





Vital Signs - First Documented








 22





 11:42


 


Temp 36.3


 


Pulse 112


 


Resp 22


 


B/P (MAP) 146/92 (110)


 


Pulse Ox 93


 


O2 Delivery Room Air


 


O2 Flow Rate 5.00





Capillary Refill :


Height: '"


Weight: lbs. oz. kg; 54.00 BMI


Method:


General Appearance:  mild distress, obese


Eyes:  Bilateral Eye Normal Inspection, Bilateral Eye PERRL, Bilateral Eye EOMI


HEENT:  PERRL/EOMI, normal ENT inspection, TMs normal, pharynx normal


Neck:  non-tender, full range of motion, supple, normal inspection


Respiratory:  chest non-tender, decreased breath sounds


Cardiovascular:  normal peripheral pulses, regular rate, rhythm, tachycardia 

(100-108)


Gastrointestinal:  normal bowel sounds, non tender, soft, distended


Extremities:  normal range of motion (with mild limitations due to body 

habitus), non-tender, pedal edema (2+)


Neurologic/Psychiatric:  no motor/sensory deficits, alert, normal mood/affect, 

oriented x 3


Skin:  normal color, warm/dry





Progress/Results/Core Measures


Suspected Sepsis


SIRS


Temperature: 


Pulse: 112 


Respiratory Rate: 22


 


Laboratory Tests


22 11:50: White Blood Count 6.7


Blood Pressure 146 /92 


Mean: 110


 


Laboratory Tests


22 11:50: 


Creatinine 1.28, INR Comment 1.0, Platelet Count 171, Total Bilirubin 1.0








Results/Orders


Lab Results





Laboratory Tests








Test


 22


11:45 22


11:50 22


12:20 Range/Units


 


 


Influenza Type A (RT-PCR) Not Detected    Not Detecte  


 


Influenza Type B (RT-PCR) Not Detected    Not Detecte  


 


SARS-CoV-2 RNA (RT-PCR) Not Detected    Not Detecte  


 


White Blood Count


 


 6.7 


 


 4.3-11.0


10^3/uL


 


Red Blood Count


 


 4.97 


 


 4.30-5.52


10^6/uL


 


Hemoglobin  15.4   13.3-17.7  g/dL


 


Hematocrit  45   40-54  %


 


Mean Corpuscular Volume  91   80-99  fL


 


Mean Corpuscular Hemoglobin  31   25-34  pg


 


Mean Corpuscular Hemoglobin


Concent 


 34 


 


 32-36  g/dL





 


Red Cell Distribution Width  13.6   10.0-14.5  %


 


Platelet Count


 


 171 


 


 130-400


10^3/uL


 


Mean Platelet Volume  10.1   9.0-12.2  fL


 


Immature Granulocyte % (Auto)  0    %


 


Neutrophils (%) (Auto)  69   42-75  %


 


Lymphocytes (%) (Auto)  15   12-44  %


 


Monocytes (%) (Auto)  12   0-12  %


 


Eosinophils (%) (Auto)  4   0-10  %


 


Basophils (%) (Auto)  0   0-10  %


 


Neutrophils # (Auto)


 


 4.6 


 


 1.8-7.8


10^3/uL


 


Lymphocytes # (Auto)


 


 1.0 


 


 1.0-4.0


10^3/uL


 


Monocytes # (Auto)


 


 0.8 


 


 0.0-1.0


10^3/uL


 


Eosinophils # (Auto)


 


 0.3 


 


 0.0-0.3


10^3/uL


 


Basophils # (Auto)


 


 0.0 


 


 0.0-0.1


10^3/uL


 


Immature Granulocyte # (Auto)


 


 0.0 


 


 0.0-0.1


10^3/uL


 


Prothrombin Time  13.4   12.2-14.7  SEC


 


INR Comment  1.0   0.8-1.4  


 


Activated Partial


Thromboplast Time 


 29 


 


 24-35  SEC





 


Sodium Level  143   135-145  MMOL/L


 


Potassium Level  3.5 L  3.6-5.0  MMOL/L


 


Chloride Level  105     MMOL/L


 


Carbon Dioxide Level  25   21-32  MMOL/L


 


Anion Gap  13   5-14  MMOL/L


 


Blood Urea Nitrogen  12   7-18  MG/DL


 


Creatinine


 


 1.28 


 


 0.60-1.30


MG/DL


 


Estimat Glomerular Filtration


Rate 


 69 


 


  





 


BUN/Creatinine Ratio  9    


 


Glucose Level  170 H    MG/DL


 


Calcium Level  8.7   8.5-10.1  MG/DL


 


Corrected Calcium  8.8   8.5-10.1  MG/DL


 


Total Bilirubin  1.0   0.1-1.0  MG/DL


 


Aspartate Amino Transf


(AST/SGOT) 


 26 


 


 5-34  U/L





 


Alanine Aminotransferase


(ALT/SGPT) 


 40 


 


 0-55  U/L





 


Alkaline Phosphatase  86     U/L


 


Lactate Dehydrogenase  225 H  125-220  U/L


 


C-Reactive Protein High


Sensitivity 


 1.18 H


 


 0.00-0.50


MG/DL


 


B-Type Natriuretic Peptide  193.8 H  <100.0  PG/ML


 


Total Protein  7.3   6.4-8.2  GM/DL


 


Albumin  3.9   3.2-4.5  GM/DL


 


Procalcitonin  0.07   <0.10  NG/ML


 


Blood Gas Puncture Site   LW   


 


Blood Gas Patient Temperature   36.6   


 


Arterial Blood pH   7.34 *L 7.37-7.43  


 


Arterial Blood Partial


Pressure CO2 


 


 57 H


 35-45  MMHG





 


Arterial Blood Partial


Pressure O2 


 


 61 L


 79-93  MMHG





 


Arterial Blood HCO3   30 H 23-27  MMOL/L


 


Arterial Blood Total CO2


 


 


 31.4 H


 21.0-31.0


MMOL/L


 


Arterial Blood Oxygen


Saturation 


 


 94 


   %





 


Arterial Blood Base Excess


 


 


 4.1 H


 -2.5-2.5


MMOL/L


 


Monty Test   YES-POS   


 


Blood Gas Ventilator Setting   NO   


 


Blood Gas Inspired Oxygen   2L   








My Orders





Orders - FRANCHESKA SIMONS


Cbc With Automated Diff (22 11:50)


Comprehensive Metabolic Panel (22 11:50)


Procalcitonin (Pct) (22 11:50)


Hs C Reactive Protein (22 11:50)


LDH (22 11:50)


Sputum Culture (22 11:50)


Ekg Tracing (22 11:50)


Influenza A And B By Pcr (22 11:50)


Chest 1 View, Ap/Pa Only (22 11:50)


Covid-19 Suspect Update (22 11:50)


Covid 19 Inhouse Test (22 11:50)


Bnp Dante (22 11:50)


Protime With Inr (22 11:50)


Partial Thromboplastin Time (22 11:50)


Arterial Blood Gas (22 11:53)


Ed Iv/Invasive Line Start (22 11:53)


Ns Iv 500 Ml (Sodium Chloride 0.9%) (22 12:00)


Albuterol Inhaler (Albuterol) (22 14:00)


Benzonatate Capsule (Tessalon Perles) (22 12:09)


Albuterol Inhaler (Albuterol) (22 12:14)


Ua Culture If Indicated (22 12:48)


Methylprednisolone Sod Succ (Solu-Medrol (22 13:00)





Medications Given in ED





Current Medications








 Medications  Dose


 Ordered  Sig/Luis


 Route  Start Time


 Stop Time Status Last Admin


Dose Admin


 


 Sodium Chloride  500 ml @ 0


 mls/hr  Q0M ONCE


 IV  22 12:00


 22 12:01 DC 22 12:18


500 MLS/HR








Vital Signs/I&O











 22





 11:42 11:42


 


Temp  36.3


 


Pulse  112


 


Resp  22


 


B/P (MAP)  146/92 (110)


 


Pulse Ox  93


 


O2 Delivery Room Air OxyMask


 


O2 Flow Rate  5.00





Capillary Refill :








Blood Pressure Mean:                    110








Progress Note :  


   Time:  11:40


Progress Note


Patient seen and evaluated, stabilized with oxime mask keeping SaO2 greater than

90%.  Will obtain labs, chest x-ray, ABGs.  Respiratory therapy noted high for 

possible BiPAP.


1200 SaO2 continuing at 96 to 98% with oxime mask, will convert to nasal cannula

at 2 L.  Patient able to maintain greater than 95%, decreased to 1 L and 

continuing to be stable.  Heart rate has been decreased to .


1220 Ventolin inhaler, 2 puffs. Noted better air movement but wheezing. B/P with

wrist cuff 230s/140s, with extra large arm cuff 160s/110s. Will give Lasix and 

Hydralazine, then re-assess before additional B/P meds. 


1240 Dr. Perez in ED to see patient. Will admit to ICU. COVID, Flu negative. No

Pneumonia per CXR. Glucose 170 and patient reports no food or fluids for last 24

hours. 


1320 patient urinated multiple times after Lasix, SaO2 would decrease to upper 

80s with mobility but remained 92-95% with NC at 1L and rest. Patient denies any

complaints. 


1400 patient transferred to ICU.





ECG


Initial ECG Impression Date:  2022


Initial ECG Impression Time:  11:54


Initial ECG Rate:  104


Initial ECG Rhythm:  S.Tach


Initial ECG Intervals:  Normal


Initial ECG Intervals


, QRS D 92, , QTc 437.  


Axis P 70, , T 90.


Initial ECG Impression:  Normal


Initial ECG Comparisson:  No Previous ECG Available





Diagnostic Imaging





   Diagonstic Imaging:  Xray


   Plain Films/CT/US/NM/MRI:  chest


Comments


NAME:   GHASSAN ALAN


[facility] REC#:   [id number]


ACCOUNT#:   [id number]


PT STATUS:   REG ER


:   1975


PHYSICIAN:   FRANCHESKA SIMONS


ADMIT DATE:   22/ER


***Draft***


Date of Exam:22





CHEST 1 VIEW, AP/PA ONLY








EXAMINATION: Chest 1 view





HISTORY: Hypoxia, cough, SOA





COMPARISON: None available.





FINDINGS: 





Heart size and pulmonary vasculature are mildly enlarged. There


are diffuse interstitial opacities throughout both lungs. No


pleural effusion or pneumothorax. The osseous structures are


intact.





IMPRESSION: 





1. Cardiomegaly and pulmonary vasculature congestion with diffuse


interstitial opacities are both lungs. Findings can be seen with


pulmonary edema or atypical infection.





  Dictated on workstation # [id number]








Dict:   22 [id number]


Trans:   22 1238


 [id number]





Interpreted by:     LORRAINE MARIEE DO


Electronically signed by:





Departure


Impression





   Primary Impression:  


   Hypoxemia


   Additional Impressions:  


   Cough


   Qualified Codes:  R05.1 - Acute cough


   Hypertension


   Qualified Codes:  I10 - Essential (primary) hypertension


   Sleep apnea


   Qualified Codes:  G47.30 - Sleep apnea, unspecified


   Obesities, morbid


   Hyperglycemia


Disposition:  09 ADMITTED AS INPATIENT


Condition:  Stable





Admissions


Decision to Admit/Date:  2022


Time/Decision to Admit Time:  12:30





Departure-Patient Inst.


Referrals:  


[facility]ELA





NO,LOCAL PHYSICIAN (PCP)


Primary Care Physician











FRANCHESKA SIMONS                  2022 12:09

## 2022-11-28 VITALS — SYSTOLIC BLOOD PRESSURE: 195 MMHG | DIASTOLIC BLOOD PRESSURE: 81 MMHG

## 2022-11-28 VITALS — DIASTOLIC BLOOD PRESSURE: 94 MMHG | SYSTOLIC BLOOD PRESSURE: 157 MMHG

## 2022-11-28 LAB
BASOPHILS # BLD AUTO: 0 10^3/UL (ref 0–0.1)
BASOPHILS NFR BLD AUTO: 0 % (ref 0–10)
BUN/CREAT SERPL: 13
CALCIUM SERPL-MCNC: 8.9 MG/DL (ref 8.5–10.1)
CHLORIDE SERPL-SCNC: 102 MMOL/L (ref 98–107)
CO2 SERPL-SCNC: 22 MMOL/L (ref 21–32)
CREAT SERPL-MCNC: 1.36 MG/DL (ref 0.6–1.3)
EOSINOPHIL # BLD AUTO: 0 10^3/UL (ref 0–0.3)
EOSINOPHIL NFR BLD AUTO: 0 % (ref 0–10)
GFR SERPLBLD BASED ON 1.73 SQ M-ARVRAT: 64 ML/MIN
GLUCOSE SERPL-MCNC: 209 MG/DL (ref 70–105)
HCT VFR BLD CALC: 45 % (ref 40–54)
HGB BLD-MCNC: 15.2 G/DL (ref 13.3–17.7)
LYMPHOCYTES # BLD AUTO: 0.4 10^3/UL (ref 1–4)
LYMPHOCYTES NFR BLD AUTO: 5 % (ref 12–44)
MAGNESIUM SERPL-MCNC: 2 MG/DL (ref 1.6–2.4)
MANUAL DIFFERENTIAL PERFORMED BLD QL: YES
MCH RBC QN AUTO: 31 PG (ref 25–34)
MCHC RBC AUTO-ENTMCNC: 34 G/DL (ref 32–36)
MCV RBC AUTO: 93 FL (ref 80–99)
MONOCYTES # BLD AUTO: 0.2 10^3/UL (ref 0–1)
MONOCYTES NFR BLD AUTO: 2 % (ref 0–12)
MONOCYTES NFR BLD: 2 %
NEUTROPHILS # BLD AUTO: 8 10^3/UL (ref 1.8–7.8)
NEUTROPHILS NFR BLD AUTO: 92 % (ref 42–75)
NEUTS BAND NFR BLD MANUAL: 93 %
PLATELET # BLD: 183 10^3/UL (ref 130–400)
PMV BLD AUTO: 10.7 FL (ref 9–12.2)
POTASSIUM SERPL-SCNC: 4.8 MMOL/L (ref 3.6–5)
RBC MORPH BLD: NORMAL
SODIUM SERPL-SCNC: 138 MMOL/L (ref 135–145)
VARIANT LYMPHS NFR BLD MANUAL: 5 %
WBC # BLD AUTO: 8.7 10^3/UL (ref 4.3–11)

## 2022-11-28 RX ADMIN — INSULIN ASPART SCH UNIT: 100 INJECTION, SOLUTION INTRAVENOUS; SUBCUTANEOUS at 21:24

## 2022-11-28 RX ADMIN — IPRATROPIUM BROMIDE AND ALBUTEROL SULFATE SCH ML: .5; 3 SOLUTION RESPIRATORY (INHALATION) at 22:06

## 2022-11-28 RX ADMIN — METHYLPREDNISOLONE SODIUM SUCCINATE SCH MG: 40 INJECTION, POWDER, FOR SOLUTION INTRAMUSCULAR; INTRAVENOUS at 17:02

## 2022-11-28 RX ADMIN — IPRATROPIUM BROMIDE AND ALBUTEROL SULFATE SCH ML: .5; 3 SOLUTION RESPIRATORY (INHALATION) at 15:25

## 2022-11-28 RX ADMIN — POTASSIUM CHLORIDE SCH MEQ: 1500 TABLET, EXTENDED RELEASE ORAL at 07:33

## 2022-11-28 RX ADMIN — ALBUTEROL SULFATE SCH PUFF: 90 AEROSOL, METERED RESPIRATORY (INHALATION) at 07:35

## 2022-11-28 RX ADMIN — METHYLPREDNISOLONE SODIUM SUCCINATE SCH MG: 40 INJECTION, POWDER, FOR SOLUTION INTRAMUSCULAR; INTRAVENOUS at 06:01

## 2022-11-28 RX ADMIN — SENNOSIDES SCH MG: 8.6 TABLET, FILM COATED ORAL at 21:24

## 2022-11-28 RX ADMIN — INSULIN ASPART SCH UNIT: 100 INJECTION, SOLUTION INTRAVENOUS; SUBCUTANEOUS at 17:03

## 2022-11-28 RX ADMIN — INSULIN ASPART SCH UNIT: 100 INJECTION, SOLUTION INTRAVENOUS; SUBCUTANEOUS at 06:02

## 2022-11-28 RX ADMIN — METHYLPREDNISOLONE SODIUM SUCCINATE SCH MG: 40 INJECTION, POWDER, FOR SOLUTION INTRAMUSCULAR; INTRAVENOUS at 12:03

## 2022-11-28 RX ADMIN — IPRATROPIUM BROMIDE AND ALBUTEROL SULFATE SCH ML: .5; 3 SOLUTION RESPIRATORY (INHALATION) at 10:34

## 2022-11-28 RX ADMIN — ENOXAPARIN SODIUM SCH MG: 100 INJECTION SUBCUTANEOUS at 17:02

## 2022-11-28 RX ADMIN — DOCUSATE SODIUM SCH MG: 100 CAPSULE ORAL at 08:16

## 2022-11-28 RX ADMIN — ALBUTEROL SULFATE SCH PUFF: 90 AEROSOL, METERED RESPIRATORY (INHALATION) at 02:56

## 2022-11-28 RX ADMIN — FUROSEMIDE SCH MG: 10 INJECTION, SOLUTION INTRAVENOUS at 06:01

## 2022-11-28 RX ADMIN — METOPROLOL SUCCINATE SCH MG: 100 TABLET, EXTENDED RELEASE ORAL at 21:24

## 2022-11-28 RX ADMIN — MAGNESIUM SULFATE IN DEXTROSE SCH MLS/HR: 10 INJECTION, SOLUTION INTRAVENOUS at 07:32

## 2022-11-28 RX ADMIN — POTASSIUM CHLORIDE SCH MLS/HR: 200 INJECTION, SOLUTION INTRAVENOUS at 07:32

## 2022-11-28 RX ADMIN — HYDROCODONE BITARTRATE AND ACETAMINOPHEN PRN EA: 5; 325 TABLET ORAL at 21:27

## 2022-11-28 RX ADMIN — HYDRALAZINE HYDROCHLORIDE PRN MG: 20 INJECTION INTRAMUSCULAR; INTRAVENOUS at 12:03

## 2022-11-28 RX ADMIN — INSULIN ASPART SCH UNIT: 100 INJECTION, SOLUTION INTRAVENOUS; SUBCUTANEOUS at 10:55

## 2022-11-28 RX ADMIN — HYDRALAZINE HYDROCHLORIDE PRN MG: 20 INJECTION INTRAMUSCULAR; INTRAVENOUS at 08:16

## 2022-11-28 RX ADMIN — METHYLPREDNISOLONE SODIUM SUCCINATE SCH MG: 40 INJECTION, POWDER, FOR SOLUTION INTRAMUSCULAR; INTRAVENOUS at 00:56

## 2022-11-28 RX ADMIN — ENOXAPARIN SODIUM SCH MG: 100 INJECTION SUBCUTANEOUS at 06:01

## 2022-11-28 RX ADMIN — SENNOSIDES SCH MG: 8.6 TABLET, FILM COATED ORAL at 08:16

## 2022-11-28 RX ADMIN — DOCUSATE SODIUM SCH MG: 100 CAPSULE ORAL at 21:24

## 2022-11-28 RX ADMIN — IPRATROPIUM BROMIDE AND ALBUTEROL SULFATE SCH ML: .5; 3 SOLUTION RESPIRATORY (INHALATION) at 18:48

## 2022-11-28 RX ADMIN — FUROSEMIDE SCH MG: 10 INJECTION, SOLUTION INTRAVENOUS at 16:54

## 2022-11-28 NOTE — CONSULTATION-CARDIOLOGY
HPI-Cardiology


Cardiology Consultation:


Date of Consultation


11/28/22


Time Seen by a Provider:  09:15


Date of Admission





Attending Physician


No,Local Physician


Admitting Physician


Admitting Physician:


Nicolasa Perez MD 








Attending Physician:


Nicolasa Perez MD


Consulting Physician


SOFI DYSON MD, MA, FACP, FACC, FSCAI, CCDS





Physician requesting consult: Dr Rivera





HPI:


Chief Complaint:


Reason for Card consult: CHF





48 yo man admitted to [facility] (Dr Perez / Dr Rivera) on 11/27/22 with 

increasing shortness of breath for several days. No n/v/d. No cp. No palp or 

syncope. Chronic, mild to mod, bilat leg swelling. Gen weakness and malaise





Review of Systems-Cardiology


Review of Systems


Constitutional:  As described under HPI


Eyes:  No vision change


Ears/Nose/Throat:  No ear discharge, No nasal drainage, No recent hearing loss


Respiratory:  As described under HPI


Cardiovascular:  As described under HPI


Gastrointestinal:  As described under HPI


Genitourinary:  No dysuria, No hematuria


Musculoskeletal:  back pain (chronic); No joint pain


Skin:  No rash, No ulcerations


Psychiatric/Neurological:  No seizure, No focal weakness, No syncope


Hematologic:  No bleeding abnormalities





All Other Systems Reviewed


Negative Unless Noted:  Yes





PMH-Social-Family Hx


Patient Social History


Smoking Status:  Current Everyday Smoker


Have you traveled recently?:  No


Alcohol Use?:  No


Pt feels they are or have been:  No


Tobacco type used:  Cigarettes





Past Medical History


PMH


As described under Assessment.





Family Medical History


Family Medical History:  


He does not report fam h/o early CAD or SCD





Allergies and Home Medications


Allergies


Coded Allergies:  


     No Known Drug Allergies (Unverified , 11/27/22)





Patient Home Medication List


Home Medication List Reviewed:  Yes


No Active Prescriptions or Reported Meds





Physical Exam-Cardiology


Physical Exam


Vital Signs/I&O











 11/28/22 11/28/22 11/28/22 11/28/22





 01:15 02:00 02:56 03:00


 


Pulse  105 103 104


 


Resp  14 28 12


 


B/P (MAP)  155/91 (112)  155/82 (106)


 


Pulse Ox  98 97 99


 


O2 Delivery NIV Bilevel NIV Bilevel  NIV Bilevel


 


O2 Flow Rate 50.00 50.00 50.00 50.00





 11/28/22 11/28/22 11/28/22 11/28/22





 04:00 04:00 04:05 05:00


 


Temp 36.4   


 


Pulse  105  102


 


Resp  15  15


 


B/P (MAP)  145/83 (103)  131/67 (88)


 


Pulse Ox  97  97


 


O2 Delivery  NIV Bilevel NIV Bilevel NIV Bilevel


 


O2 Flow Rate  50.00  50.00


 


FiO2   50 


 


    





 11/28/22 11/28/22 11/28/22 11/28/22





 06:06 06:51 07:00 07:35


 


Pulse 96  98 100


 


Resp 8  9 19


 


B/P (MAP) 170/146 (154)  199/124 (149) 


 


Pulse Ox 97  96 96


 


O2 Delivery NIV Bilevel NIV Bilevel NIV Bilevel 


 


O2 Flow Rate 50.00 40.00 40.00 40.00





 11/28/22 11/28/22 11/28/22 11/28/22





 07:43 08:00 08:00 09:00


 


Pulse 101  101 106


 


Resp   17 12


 


B/P (MAP)   212/132 (158) 182/109 (133)


 


Pulse Ox   97 97


 


O2 Delivery  NIV Bilevel NIV Bilevel NIV Bilevel


 


O2 Flow Rate   40.00 40.00


 


FiO2  40  





 11/28/22 11/28/22 11/28/22 11/28/22





 10:34 10:45 10:47 10:49


 


Pulse  110 38 112


 


Resp  17  


 


B/P (MAP)  195/81 (119)  


 


Pulse Ox 94 93  


 


O2 Delivery Nasal Cannula NIV Bilevel  


 


O2 Flow Rate 2.00 40.00  





 11/28/22 11/28/22 11/28/22 11/28/22





 10:57 11:00 11:15 11:30


 


Pulse 109 93 105 99


 


Resp 18 11 18 21


 


B/P (MAP)  214/132 (159) 196/115 (142) 206/122 (150)


 


Pulse Ox 96 91 89 96


 


O2 Delivery  NIV Bilevel NIV Bilevel NIV Bilevel


 


O2 Flow Rate 40.00 40.00 40.00 40.00





 11/28/22 11/28/22  





 11:45 12:00  


 


Pulse 101 104  


 


Resp 27 19  


 


B/P (MAP) 260/145 (183) 205/118 (147)  


 


Pulse Ox 97 97  


 


O2 Delivery NIV Bilevel NIV Bilevel  


 


O2 Flow Rate 40.00 40.00  














 11/28/22





 00:00


 


Intake Total 2105 ml


 


Output Total 3925 ml


 


Balance -1820 ml





Capillary Refill :


Constitutional:  AAO x 3, well-developed, well-nourished, other (pbese)


HEENT:  PERRL; No xanthelasmas are seen


Neck:  carotid pulses are 2 + bilaterally, with good upstrokes


Respiratory:  No accessory muscle use; other (good, bilat air entry)


Cardiovascular:  regular rate-rhythm, S1 and S2, systolic murmur (soft POORNIMA at 

card base)


Gastrointestinal:  No tender; soft; No guarding, No rebound; audible bowel 

sounds


Extremities:  swelling (mild to mod, bilateral leg edema); No clubbing, No 

cyanosis


Neurologic/Psychiatric:  oriented x 3, other (moves all limbs equally)


Skin:  No rash on exposed areas, No ulcerations on exposed areas





Data Review


Labs


Laboratory Tests


11/27/22 13:20: 


Urine Color YELLOW, Urine Clarity CLEAR, Urine pH 5.5, Urine Specific Gravity 

>=1.030, Urine Protein TRACEH, Urine Glucose (UA) TRACEH, Urine Ketones 

NEGATIVE, Urine Nitrite NEGATIVE, Urine Bilirubin NEGATIVE, Urine Urobilinogen 

0.2, Urine Leukocyte Esterase NEGATIVE, Urine RBC (Auto) NEGATIVE, Urine RBC 0-

2, Urine WBC 5-10H, Urine Squamous Epithelial Cells 0-2, Urine Crystals NONE, 

Urine Bacteria FEWH, Urine Casts NONE, Urine Mucus NEGATIVE, Urine Culture 

Indicated YES


11/27/22 15:52: Glucometer 195H


11/27/22 20:35: Glucometer 353H


11/28/22 01:20: Troponin I < 0.028


11/28/22 04:45: 


White Blood Count 8.7, Red Blood Count 4.87, Hemoglobin 15.2, Hematocrit 45, 

Mean Corpuscular Volume 93, Mean Corpuscular Hemoglobin 31, Mean Corpuscular 

Hemoglobin Concent 34, Red Cell Distribution Width 13.8, Platelet Count 183, 

Mean Platelet Volume 10.7, Immature Granulocyte % (Auto) 1, Neutrophils (%) 

(Auto) 92H, Lymphocytes (%) (Auto) 5L, Monocytes (%) (Auto) 2, Eosinophils (%) 

(Auto) 0, Basophils (%) (Auto) 0, Neutrophils # (Auto) 8.0H, Lymphocytes # 

(Auto) 0.4L, Monocytes # (Auto) 0.2, Eosinophils # (Auto) 0.0, Basophils # 

(Auto) 0.0, Immature Granulocyte # (Auto) 0.1, Neutrophils % (Manual) 93, 

Lymphocytes % (Manual) 5, Monocytes % (Manual) 2, Blood Morphology Comment 

NORMAL, Sodium Level 138, Potassium Level 4.8, Chloride Level 102, Carbon 

Dioxide Level 22, Anion Gap 14, Blood Urea Nitrogen 17, Creatinine 1.36H, 

Estimat Glomerular Filtration Rate 64, BUN/Creatinine Ratio 13, Glucose Level 

209H, Calcium Level 8.9, Magnesium Level 2.0


11/28/22 10:51: Glucometer 358H





Microbiology


11/27/22 Urine Culture - Preliminary, Resulted


           NO GROWTH


11/27/22 Gram Stain - Final, Resulted


           


11/27/22 Sputum Culture, Resulted


           Pending





Laboratory Tests


11/27/22 11:50








11/28/22 04:45











A/P-Cardiology


Assessment/Admission Diagnosis





Ac diastolic CHF, likely due to hypertensive CVD


- echo on 11/28/22: LVEF 50-55%, mild biatrial enlargement





Severe htn and HCVD





Suspected sleep apnea





Obesity with suspected obesity-hypoventilation syndrome





Discussion and Recomendations





* Treat CHF with diuretics


* Treat hypertension with beta-blocker


* Advised cessation of tobacco use


* Advised sleep studies


* Monitor labs


* Further recs based on hosp course











SOFI DYSON MD [facility] CCDS   Nov 28, 2022 13:06 No

## 2022-11-28 NOTE — TELE-ICU PROGRESS NOTE
Subjective


Date Seen by a Provider:  Nov 28, 2022


Time Seen by a Provider:  09:19


Subjective/Events-last exam


(Tele-ICU Physician , Progress Note )


Service provided via interactive audio and video telecommunications  E-CARE 

system to a patient admitted to ICU bed in [facility].





Available chart/ vitals / labs / Images reviewed


Video assessment done using   teleICU camera, rest of exam as per RN


Discussed with RN


Events overnight : 





Afebrile


hemodynamically stable


Respiratory -  2L


I/O = UO 4 L 





Drips: none


Pressors- no











A/P





HYpoxia on admission and dyspnea- pulmonaty edema 


- presumed CHF 


- responded to lasix well 


- wheezing reported in ER  - as per chart , started on steroids  IV  40 q6 - ? 

to taper down 





Hypertension 


 - BP in ER 230s/140- improved 





GLORIA


- very mild with diuresis - monitor 





Reported h/o VI- not using at home 


mild resp acidosis on admission 


 - follow closely , CPAP noctrnal 





AC with lovenox - as per cards consult today 








Lines :      , (Central Line Necessity Reviewed)


Mora:


OG:


Nutrition:


Analgesia:


Anxiety/ delirium





VTE Prophylaxis: lov 60 q12 


Stress Ulcer Prophylaxis:





Plans in collaboration with   bedside consultants and IM MDs.


Discussed with RN to reach out if any questions or concerns


A total of  20 minutes of critical care time was devoted to this patient today, 

required to treat and/or prevent further deterioration of  critical care co

ndition ( as above ) .





I am remotely monitoring this patient from another state.  I am unable to do the

bedside exam, and history/physical and pertinent information is taken from other

notes in the computer and bedside staff. .





Sepsis Event


Evaluation


Height, Weight, BMI


Height: '"


Weight: lbs. oz. kg; 56.64 BMI


Method:





Exam


Exam


Patient acknowledged, consented, and participated in this virtual visit which 

was conducted using real time audio/video


Vital Signs








  Date Time  Temp Pulse Resp B/P (MAP) Pulse Ox O2 Delivery O2 Flow Rate FiO2


 


11/28/22 08:00  101 17 212/132 (158) 97 NIV Bilevel 40.00 


 


11/28/22 07:43  101      


 


11/28/22 07:35  100 19  96  40.00 


 


11/28/22 07:00  98 9 199/124 (149) 96 NIV Bilevel 40.00 


 


11/28/22 06:51      NIV Bilevel 40.00 


 


11/28/22 06:06  96 8 170/146 (154) 97 NIV Bilevel 50.00 


 


11/28/22 05:00  102 15 131/67 (88) 97 NIV Bilevel 50.00 


 


11/28/22 04:05      NIV Bilevel  50


 


11/28/22 04:00  105 15 145/83 (103) 97 NIV Bilevel 50.00 


 


11/28/22 04:00 36.4       


 


11/28/22 03:00  104 12 155/82 (106) 99 NIV Bilevel 50.00 


 


11/28/22 02:56  103 28  97  50.00 


 


11/28/22 02:00  105 14 155/91 (112) 98 NIV Bilevel 50.00 


 


11/28/22 01:15      NIV Bilevel 50.00 


 


11/28/22 01:00  106 26 141/74 (96) 98 NIV Bilevel 60.00 


 


11/28/22 01:00  108      


 


11/28/22 00:21  117 26 173/92 (119) 100 NIV Bilevel 60.00 


 


11/28/22 00:00 36.5       


 


11/27/22 23:59      NIV Bilevel  60


 


11/27/22 23:37      NIV Bilevel 60.00 


 


11/27/22 23:21  113 22 157/94 (115) 96 Nasal Cannula 2.00 


 


11/27/22 23:14  118 28  96  60.00 


 


11/27/22 22:00  108 26 151/113 (126) 92 Nasal Cannula 2.00 


 


11/27/22 21:00  113 30 134/103 (113) 94 Nasal Cannula 2.00 


 


11/27/22 20:34  115  162/100 (120) 94 Nasal Cannula 2.00 


 


11/27/22 20:00      Nasal Cannula 2.00 


 


11/27/22 20:00 36.6       


 


11/27/22 19:00  116  187/114 (138) 94 Nasal Cannula 2.00 


 


11/27/22 19:00  116      


 


11/27/22 18:29     94 Room Air  


 


11/27/22 18:00  109 26  92 Nasal Cannula 2.00 


 


11/27/22 17:00  104 23 172/102 (125) 93 Nasal Cannula 2.00 


 


11/27/22 16:00 36.1       


 


11/27/22 16:00  107 18 146/71 (96) 92 Nasal Cannula 2.00 


 


11/27/22 15:21      Nasal Cannula 2.00 


 


11/27/22 15:07 36.3 107   97   


 


11/27/22 15:00  105 18 157/98 (117) 95 Nasal Cannula 2.00 


 


11/27/22 14:50  107      


 


11/27/22 14:49      Nasal Cannula 2.00 


 


11/27/22 14:16  112 16 155/87 97 Nasal Cannula 2.00 





       2.00 


 


11/27/22 13:33     92 Nasal Cannula 2.00 


 


11/27/22 13:01 36.3 112 22 156/104 (121) 93 Room Air 5.00 





       5.00 


 


11/27/22 11:42 36.3 112 22 146/92 (110) 93 OxyMask 5.00 


 


11/27/22 11:42      Room Air  














I & O 


 


 11/28/22





 07:00


 


Intake Total 2105 ml


 


Output Total 4275 ml


 


Balance -2170 ml








Height & Weight


Height: '"


Weight: lbs. oz. kg; 56.64 BMI


Method:


General Appearance:  No Apparent Distress, WD/WN


HEENT:  PERRL/EOMI, Pharynx Normal


Neck:  Normal Inspection, Supple


Respiratory:  No Accessory Muscle Use, No Respiratory Distress, Wheezing, Other 

(on BiPAP)


Cardiovascular:  Regular Rate, Rhythm, No Murmur, Normal Peripheral Pulses


Gastrointestinal:  normal bowel sounds, non tender, soft, distended


Extremity:  Non Tender, Pedal Edema, Swelling


Neurologic/Psychiatric:  Alert, Oriented x3


Skin:  Normal Color, Warm/Dry





Results


Lab


Laboratory Tests


11/27/22 11:50








11/28/22 04:45











Assessment/Plan


Assessment/Plan


1











NILTON FIGUEROA MD         Nov 28, 2022 09:19

## 2022-11-28 NOTE — PROGRESS NOTE - HOSPITALIST
Subjective


HPI/CC On Admission


Date Seen by Provider:  Nov 28, 2022


August Wang is a 47 year old male who presented with shortness of breath. He 

has been feeling sick for the past month. He has been getting more short of 

breath recently. He has sweling in his legs which moves up to his abdomen. He 

denies fevers. He denies chest pain. He denies nausea and vomiting. He does not 

have a primary care doctor. He has a history of VI but is not compliant with 

CPAP.





Objective


Exam


Vital Signs





Vital Signs








  Date Time  Temp Pulse Resp B/P (MAP) Pulse Ox O2 Delivery O2 Flow Rate FiO2


 


11/28/22 08:00  101 17 212/132 (158) 97 NIV Bilevel 40.00 


 


11/28/22 04:05        50


 


11/28/22 04:00 36.4       





Capillary Refill :


General Appearance:  No Apparent Distress, WD/WN


Respiratory:  No Accessory Muscle Use, No Respiratory Distress, Wheezing, Other 

(on BiPAP)


Cardiovascular:  Regular Rate, Rhythm, No Murmur, Normal Peripheral Pulses


Gastrointestinal:  Normal Bowel Sounds, Non Tender, Soft


Neurologic/Psychiatric:  Alert, Oriented x3





Results/Procedures


Lab


Laboratory Tests


11/27/22 11:50








11/28/22 04:45








Patient resulted labs reviewed.


Imaging:  Reviewed Imaging Films, Reviewed Imaging Report





Assessment/Plan


Assessment and Plan


Assess & Plan/Chief Complaint


Acute respiratory failure with hypoxia and hypercapnia


CHF


COPD


HTN


Super obesity


   CXR with pulmonary edema


   Lasix -1800mL overnight


   Echo ordered to be done today


   Cardiology consulted, appreciate recs- discussed with ALENA Cho for Dr David Hernandez at night and breathing 


   Steroids


   MAT protocol


   Supplemental oxygen as needed


   Hydralazine as needed





Hyperglycemia


   A1C ordered, pending


   Sliding scale insulin





DVT prophylaxis: Lovenox





Critical Care


Critically Ill Patient











KARLI MITCHELL MD              Nov 28, 2022 08:58

## 2022-11-29 VITALS — SYSTOLIC BLOOD PRESSURE: 144 MMHG | DIASTOLIC BLOOD PRESSURE: 92 MMHG

## 2022-11-29 VITALS — SYSTOLIC BLOOD PRESSURE: 137 MMHG | DIASTOLIC BLOOD PRESSURE: 81 MMHG

## 2022-11-29 LAB
BASOPHILS # BLD AUTO: 0 10^3/UL (ref 0–0.1)
BASOPHILS NFR BLD AUTO: 0 % (ref 0–10)
BUN/CREAT SERPL: 18
CALCIUM SERPL-MCNC: 8.9 MG/DL (ref 8.5–10.1)
CHLORIDE SERPL-SCNC: 97 MMOL/L (ref 98–107)
CHOLEST SERPL-MCNC: 204 MG/DL (ref ?–200)
CO2 SERPL-SCNC: 26 MMOL/L (ref 21–32)
CREAT SERPL-MCNC: 1.53 MG/DL (ref 0.6–1.3)
EOSINOPHIL # BLD AUTO: 0 10^3/UL (ref 0–0.3)
EOSINOPHIL NFR BLD AUTO: 0 % (ref 0–10)
GFR SERPLBLD BASED ON 1.73 SQ M-ARVRAT: 55 ML/MIN
GLUCOSE SERPL-MCNC: 182 MG/DL (ref 70–105)
HCT VFR BLD CALC: 44 % (ref 40–54)
HDLC SERPL-MCNC: 46 MG/DL (ref 40–60)
HGB BLD-MCNC: 14.7 G/DL (ref 13.3–17.7)
LYMPHOCYTES # BLD AUTO: 0.5 10^3/UL (ref 1–4)
LYMPHOCYTES NFR BLD AUTO: 5 % (ref 12–44)
MAGNESIUM SERPL-MCNC: 2.1 MG/DL (ref 1.6–2.4)
MANUAL DIFFERENTIAL PERFORMED BLD QL: NO
MCH RBC QN AUTO: 31 PG (ref 25–34)
MCHC RBC AUTO-ENTMCNC: 33 G/DL (ref 32–36)
MCV RBC AUTO: 95 FL (ref 80–99)
MONOCYTES # BLD AUTO: 0.5 10^3/UL (ref 0–1)
MONOCYTES NFR BLD AUTO: 6 % (ref 0–12)
NEUTROPHILS # BLD AUTO: 7.5 10^3/UL (ref 1.8–7.8)
NEUTROPHILS NFR BLD AUTO: 88 % (ref 42–75)
PHOSPHATE SERPL-MCNC: 5.3 MG/DL (ref 2.3–4.7)
PLATELET # BLD: 176 10^3/UL (ref 130–400)
PMV BLD AUTO: 10.8 FL (ref 9–12.2)
POTASSIUM SERPL-SCNC: 4.4 MMOL/L (ref 3.6–5)
SODIUM SERPL-SCNC: 136 MMOL/L (ref 135–145)
TRIGL SERPL-MCNC: 68 MG/DL (ref ?–150)
VLDLC SERPL CALC-MCNC: 14 MG/DL (ref 5–40)
WBC # BLD AUTO: 8.5 10^3/UL (ref 4.3–11)

## 2022-11-29 RX ADMIN — IPRATROPIUM BROMIDE AND ALBUTEROL SULFATE SCH ML: .5; 3 SOLUTION RESPIRATORY (INHALATION) at 09:59

## 2022-11-29 RX ADMIN — METOPROLOL SUCCINATE SCH MG: 100 TABLET, EXTENDED RELEASE ORAL at 20:41

## 2022-11-29 RX ADMIN — INSULIN ASPART SCH UNIT: 100 INJECTION, SOLUTION INTRAVENOUS; SUBCUTANEOUS at 06:08

## 2022-11-29 RX ADMIN — IPRATROPIUM BROMIDE AND ALBUTEROL SULFATE SCH ML: .5; 3 SOLUTION RESPIRATORY (INHALATION) at 13:44

## 2022-11-29 RX ADMIN — METOPROLOL SUCCINATE SCH MG: 100 TABLET, EXTENDED RELEASE ORAL at 07:54

## 2022-11-29 RX ADMIN — DOCUSATE SODIUM SCH MG: 100 CAPSULE ORAL at 07:54

## 2022-11-29 RX ADMIN — IPRATROPIUM BROMIDE AND ALBUTEROL SULFATE SCH ML: .5; 3 SOLUTION RESPIRATORY (INHALATION) at 19:05

## 2022-11-29 RX ADMIN — METHYLPREDNISOLONE SODIUM SUCCINATE SCH MG: 40 INJECTION, POWDER, FOR SOLUTION INTRAMUSCULAR; INTRAVENOUS at 00:40

## 2022-11-29 RX ADMIN — SENNOSIDES SCH MG: 8.6 TABLET, FILM COATED ORAL at 07:55

## 2022-11-29 RX ADMIN — Medication SCH MG: at 07:55

## 2022-11-29 RX ADMIN — MAGNESIUM SULFATE IN DEXTROSE SCH MLS/HR: 10 INJECTION, SOLUTION INTRAVENOUS at 06:10

## 2022-11-29 RX ADMIN — HYDROCODONE BITARTRATE AND ACETAMINOPHEN PRN EA: 5; 325 TABLET ORAL at 09:36

## 2022-11-29 RX ADMIN — INSULIN ASPART SCH UNIT: 100 INJECTION, SOLUTION INTRAVENOUS; SUBCUTANEOUS at 17:10

## 2022-11-29 RX ADMIN — ENOXAPARIN SODIUM SCH MG: 100 INJECTION SUBCUTANEOUS at 06:08

## 2022-11-29 RX ADMIN — METHYLPREDNISOLONE SODIUM SUCCINATE SCH MG: 40 INJECTION, POWDER, FOR SOLUTION INTRAMUSCULAR; INTRAVENOUS at 11:25

## 2022-11-29 RX ADMIN — ENOXAPARIN SODIUM SCH MG: 100 INJECTION SUBCUTANEOUS at 17:10

## 2022-11-29 RX ADMIN — FUROSEMIDE SCH MG: 10 INJECTION, SOLUTION INTRAVENOUS at 06:08

## 2022-11-29 RX ADMIN — INSULIN ASPART SCH UNIT: 100 INJECTION, SOLUTION INTRAVENOUS; SUBCUTANEOUS at 21:12

## 2022-11-29 RX ADMIN — ASPIRIN 81 MG CHEWABLE TABLET SCH MG: 81 TABLET CHEWABLE at 07:54

## 2022-11-29 RX ADMIN — FUROSEMIDE SCH MG: 10 INJECTION, SOLUTION INTRAVENOUS at 17:09

## 2022-11-29 RX ADMIN — IPRATROPIUM BROMIDE AND ALBUTEROL SULFATE SCH ML: .5; 3 SOLUTION RESPIRATORY (INHALATION) at 02:36

## 2022-11-29 RX ADMIN — IPRATROPIUM BROMIDE AND ALBUTEROL SULFATE SCH ML: .5; 3 SOLUTION RESPIRATORY (INHALATION) at 22:36

## 2022-11-29 RX ADMIN — METHYLPREDNISOLONE SODIUM SUCCINATE SCH MG: 40 INJECTION, POWDER, FOR SOLUTION INTRAMUSCULAR; INTRAVENOUS at 17:10

## 2022-11-29 RX ADMIN — HYDROCODONE BITARTRATE AND ACETAMINOPHEN PRN EA: 5; 325 TABLET ORAL at 20:45

## 2022-11-29 RX ADMIN — IPRATROPIUM BROMIDE AND ALBUTEROL SULFATE SCH ML: .5; 3 SOLUTION RESPIRATORY (INHALATION) at 07:12

## 2022-11-29 RX ADMIN — INSULIN ASPART SCH UNIT: 100 INJECTION, SOLUTION INTRAVENOUS; SUBCUTANEOUS at 11:25

## 2022-11-29 RX ADMIN — POTASSIUM CHLORIDE SCH MLS/HR: 200 INJECTION, SOLUTION INTRAVENOUS at 06:10

## 2022-11-29 RX ADMIN — SENNOSIDES SCH MG: 8.6 TABLET, FILM COATED ORAL at 20:39

## 2022-11-29 RX ADMIN — POTASSIUM CHLORIDE SCH MEQ: 1500 TABLET, EXTENDED RELEASE ORAL at 06:10

## 2022-11-29 RX ADMIN — METHYLPREDNISOLONE SODIUM SUCCINATE SCH MG: 40 INJECTION, POWDER, FOR SOLUTION INTRAMUSCULAR; INTRAVENOUS at 06:08

## 2022-11-29 RX ADMIN — DOCUSATE SODIUM SCH MG: 100 CAPSULE ORAL at 20:39

## 2022-11-29 NOTE — PROGRESS NOTE - CARDIOLOGY
Cardiology SOAP Progress Note


Objective:


I&O/Vital Signs











 11/29/22 11/29/22 11/29/22 11/29/22





 20:00 20:00 20:12 21:15


 


Temp 36.6   36.6


 


Pulse 90  91 


 


Resp 16   


 


B/P (MAP) 120/66 (84)   


 


Pulse Ox 95   


 


O2 Delivery High Flow N/C High Flow N/C  


 


O2 Flow Rate 2.00 2.00  


 


    





 11/30/22 11/30/22 11/30/22 11/30/22





 00:12 01:00 02:31 04:00


 


Temp 36.6   36.4


 


Pulse 84 88 88 81


 


Resp 20  19 20


 


B/P (MAP) 138/70 (92)   156/98 (117)


 


Pulse Ox 93  96 98


 


O2 Delivery Room Air   NIV Bilevel


 


O2 Flow Rate   40.00 


 


    





 11/30/22   





 07:00   


 


Pulse 89   














 11/30/22





 00:00


 


Intake Total 1450 ml


 


Output Total 1050 ml


 


Balance 400 ml








Constitutional:  AAO x 3, well-developed, well-nourished, other (pbese)


Respiratory:  No accessory muscle use; other (good, bilat air entry)


Cardiovascular:  regular rate-rhythm, S1 and S2, systolic murmur (soft POORNIMA at 

card base)


Gastrointestional:  No tender; soft; No guarding, No rebound; audible bowel 

sounds


Extremities:  swelling (mild to mod, bilateral leg edema); No clubbing, No 

cyanosis


Neurologic/Psychiatric:  oriented x 3, other (moves all limbs equally)


Skin:  No rash on exposed areas, No ulcerations on exposed areas





Results/Procedures:


Labs


Laboratory Tests


11/29/22 10:52: Glucometer 306H


11/29/22 16:36: Glucometer 276H


11/29/22 20:50: Glucometer 316H


11/30/22 05:33: Glucometer 252H


11/30/22 06:50: 


White Blood Count 8.7, Red Blood Count 4.84, Hemoglobin 15.1, Hematocrit 45, 

Mean Corpuscular Volume 93, Mean Corpuscular Hemoglobin 31, Mean Corpuscular 

Hemoglobin Concent 33, Red Cell Distribution Width 13.3, Platelet Count 178, 

Mean Platelet Volume 11.0, Immature Granulocyte % (Auto) 1, Neutrophils (%) 

(Auto) 89H, Lymphocytes (%) (Auto) 5L, Monocytes (%) (Auto) 5, Eosinophils (%) 

(Auto) 0, Basophils (%) (Auto) 0, Neutrophils # (Auto) 7.7, Lymphocytes # (Auto)

0.4L, Monocytes # (Auto) 0.5, Eosinophils # (Auto) 0.0, Basophils # (Auto) 0.0, 

Immature Granulocyte # (Auto) 0.1, Sodium Level 136, Potassium Level 4.2, 

Chloride Level 98, Carbon Dioxide Level 27, Anion Gap 11, Blood Urea Nitrogen 

33H, Creatinine 1.52H, Estimat Glomerular Filtration Rate 56, BUN/Creatinine 

Ratio 22, Glucose Level 223H, Calcium Level 8.7, Phosphorus Level 4.6, Magnesium

Level 2.2





Microbiology


11/27/22 MRSA Screen - Final, Complete


           MRSA not isolated


11/27/22 Urine Culture - Final, Complete


           Gram Pos Mixed Bacterial Lori








A/P:


Assessment:





Ac diastolic CHF, likely due to hypertensive CVD


- echo on 11/28/22: LVEF 50-55%, mild biatrial enlargement





Severe htn and HCVD





Suspected sleep apnea





Obesity with suspected obesity-hypoventilation syndrome


Plan:





* Treat CHF with diuretics


* BP has improved with multi-drug regimen 


   * continue current regimen


* Advised cessation of tobacco use


* Advised sleep studies as out pt


* Monitor labs


* Further recs based on hosp course











KAMAR JAMES           Nov 29, 2022 11:40

## 2022-11-29 NOTE — PROGRESS NOTE - HOSPITALIST
Subjective


HPI/CC On Admission


Date Seen by Provider:  Nov 29, 2022


August Wang is a 47 year old male who presented with shortness of breath. He 

has been feeling sick for the past month. He has been getting more short of 

breath recently. He has sweling in his legs which moves up to his abdomen. He 

denies fevers. He denies chest pain. He denies nausea and vomiting. He does not 

have a primary care doctor. He has a history of VI but is not compliant with 

CPAP.


Subjective/Events-last exam


Pt reports doing well. Breating better. No complaints.





Objective


Exam


Vital Signs





Vital Signs








  Date Time  Temp Pulse Resp B/P (MAP) Pulse Ox O2 Delivery O2 Flow Rate FiO2


 


11/29/22 10:00  89 18 139/86 (103) 93 Nasal Cannula 2.00 


 


11/29/22 08:00 36.5       


 


11/29/22 04:22        40





Capillary Refill : Less Than 3 Seconds


General Appearance:  No Apparent Distress, WD/WN, Obese


Respiratory:  Lungs Clear, No Accessory Muscle Use, No Respiratory Distress


Cardiovascular:  Regular Rate, Rhythm, No Murmur


Extremity:  No Calf Tenderness, No Pedal Edema


Neurologic/Psychiatric:  Alert, Oriented x3





Results/Procedures


Lab


Laboratory Tests


11/29/22 05:15








Patient resulted labs reviewed.


Imaging:  Reviewed Imaging Films, Reviewed Imaging Report





Assessment/Plan


Assessment and Plan


Assess & Plan/Chief Complaint


Acute respiratory failure with hypoxia and hypercapnia


CHF


COPD


HTN


Super obesity


   CXR with pulmonary edema


   Lasix -2.5L total


   Echo with diastolic dysfunction and preserved EF


   Cardiology consulted, appreciate recs- discussed with ALENA Cho for Dr Ralph andino


   BiPAP at night and breathing - will need outpatient sleep study


   Steroids


   MAT protocol


   Supplemental oxygen as needed


   Hydralazine as needed


   Doing well, hopefully home tomorrow- transfer to [facility]





Hyperglycemia


   A1C 7.1 


   Sliding scale insulin





DVT prophylaxis: Lovenox





Critical Care


Critically Ill Patient











KARLI MITCHELL MD              Nov 29, 2022 11:59

## 2022-11-29 NOTE — PROGRESS NOTE - CARDIOLOGY
Cardiology SOAP Progress Note


Subjective:


Feels better


Shortness of breath has improved


No cp or palp or syncop


No n/v/d


No focal weakness





Objective:


I&O/Vital Signs











 11/29/22 11/29/22 11/29/22 11/29/22





 03:00 04:00 04:21 04:22


 


Temp   36.8 


 


Pulse 82 80  


 


Resp 11 17  


 


B/P (MAP) 125/73 (90) 126/73 (90)  


 


Pulse Ox 96 94  


 


O2 Delivery NIV Bilevel NIV Bilevel NIV Bilevel NIV Bilevel


 


O2 Flow Rate 40.00 40.00 40.00 


 


FiO2    40


 


    





 11/29/22 11/29/22 11/29/22 11/29/22





 05:00 06:00 07:00 07:12


 


Pulse 84 87 85 84


 


Resp 9 29  19


 


B/P (MAP) 131/88 (102) 142/93 (109)  


 


Pulse Ox 100 97  98


 


O2 Delivery NIV Bilevel NIV Bilevel  


 


O2 Flow Rate 40.00 40.00  40.00





 11/29/22 11/29/22 11/29/22 11/29/22





 07:47 07:56 08:00 08:16


 


Temp  36.5 36.5 


 


Pulse   85 


 


Resp   17 


 


B/P (MAP)   136/82 (100) 


 


Pulse Ox   90 


 


O2 Delivery Nasal Cannula  Nasal Cannula Nasal Cannula


 


O2 Flow Rate 2.00  2.00 2.00


 


    





 11/29/22 11/29/22 11/29/22 11/29/22





 09:00 09:59 10:00 12:00


 


Temp    36.4


 


Pulse 86  89 89


 


Resp 17  18 16


 


B/P (MAP) 132/81 (98)  139/86 (103) 144/88 (106)


 


Pulse Ox 92 95 93 94


 


O2 Delivery Nasal Cannula Nasal Cannula Nasal Cannula 


 


O2 Flow Rate 2.00 2.00 2.00 


 


    





 11/29/22 11/29/22 11/29/22 





 13:00 13:00 13:44 


 


Pulse 82 90  


 


Resp 19   


 


B/P (MAP) 122/72 (89)   


 


Pulse Ox 95  93 


 


O2 Delivery Nasal Cannula  Nasal Cannula 


 


O2 Flow Rate 2.00  2.00 














 11/29/22





 00:00


 


Intake Total 1690 ml


 


Output Total 1125 ml


 


Balance 565 ml








Constitutional:  AAO x 3, well-developed, well-nourished, other (pbese)


Respiratory:  No accessory muscle use; other (good, bilat air entry)


Cardiovascular:  regular rate-rhythm, S1 and S2, systolic murmur (soft POORNIMA at 

card base)


Gastrointestional:  No tender; soft; No guarding, No rebound; audible bowel 

sounds


Extremities:  swelling (mild to mod, bilateral leg edema); No clubbing, No 

cyanosis


Neurologic/Psychiatric:  oriented x 3, other (moves all limbs equally)


Skin:  No rash on exposed areas, No ulcerations on exposed areas





Results/Procedures:


Labs


Laboratory Tests


11/28/22 16:59: Glucometer 287H


11/28/22 20:52: Glucometer 274H


11/29/22 05:15: 


White Blood Count 8.5, Red Blood Count 4.70, Hemoglobin 14.7, Hematocrit 44, 

Mean Corpuscular Volume 95, Mean Corpuscular Hemoglobin 31, Mean Corpuscular 

Hemoglobin Concent 33, Red Cell Distribution Width 14.0, Platelet Count 176, 

Mean Platelet Volume 10.8, Immature Granulocyte % (Auto) 0, Neutrophils (%) 

(Auto) 88H, Lymphocytes (%) (Auto) 5L, Monocytes (%) (Auto) 6, Eosinophils (%) 

(Auto) 0, Basophils (%) (Auto) 0, Neutrophils # (Auto) 7.5, Lymphocytes # (Auto)

0.5L, Monocytes # (Auto) 0.5, Eosinophils # (Auto) 0.0, Basophils # (Auto) 0.0, 

Immature Granulocyte # (Auto) 0.0, Sodium Level 136, Potassium Level 4.4, 

Chloride Level 97L, Carbon Dioxide Level 26, Anion Gap 13, Blood Urea Nitrogen 

27H, Creatinine 1.53H, Estimat Glomerular Filtration Rate 55, BUN/Creatinine 

Ratio 18, Glucose Level 182H, Calcium Level 8.9, Phosphorus Level 5.3H, 

Magnesium Level 2.1, Triglycerides Level 68, Cholesterol Level 204H, LDL 

Cholesterol Direct 141H, VLDL Cholesterol 14, HDL Cholesterol 46


11/29/22 05:38: Glucometer 187H





Microbiology


11/27/22 MRSA Screen - Final, Complete


           MRSA not isolated


11/27/22 Urine Culture - Final, Complete


           Gram Pos Mixed Bacterial Lori





Laboratory Tests


11/28/22 04:45








11/29/22 05:15











A/P:


Assessment:





Ac diastolic CHF, likely due to hypertensive CVD


- echo on 11/28/22: LVEF 50-55%, mild biatrial enlargement





Severe htn and HCVD





Obstructive sleep apnea


- noncompliant with treatment





Obesity with suspected obesity-hypoventilation syndrome


Plan:





* BP has improved with multi-drug regimen 


   * continue current regimen


   * add oral diuretic (d/c iv diuretics)


* Advised cessation of tobacco use


* Advised compliance with CPAP out pt


* Outpt cardiac f/u advised











SOFI DYSON MD [facility]   Nov 29, 2022 14:54

## 2022-11-30 VITALS — SYSTOLIC BLOOD PRESSURE: 123 MMHG | DIASTOLIC BLOOD PRESSURE: 78 MMHG

## 2022-11-30 VITALS — DIASTOLIC BLOOD PRESSURE: 98 MMHG | SYSTOLIC BLOOD PRESSURE: 156 MMHG

## 2022-11-30 VITALS — SYSTOLIC BLOOD PRESSURE: 138 MMHG | DIASTOLIC BLOOD PRESSURE: 70 MMHG

## 2022-11-30 VITALS — DIASTOLIC BLOOD PRESSURE: 83 MMHG | SYSTOLIC BLOOD PRESSURE: 138 MMHG

## 2022-11-30 LAB
BASOPHILS # BLD AUTO: 0 10^3/UL (ref 0–0.1)
BASOPHILS NFR BLD AUTO: 0 % (ref 0–10)
BUN/CREAT SERPL: 22
CALCIUM SERPL-MCNC: 8.7 MG/DL (ref 8.5–10.1)
CHLORIDE SERPL-SCNC: 98 MMOL/L (ref 98–107)
CO2 SERPL-SCNC: 27 MMOL/L (ref 21–32)
CREAT SERPL-MCNC: 1.52 MG/DL (ref 0.6–1.3)
EOSINOPHIL # BLD AUTO: 0 10^3/UL (ref 0–0.3)
EOSINOPHIL NFR BLD AUTO: 0 % (ref 0–10)
GFR SERPLBLD BASED ON 1.73 SQ M-ARVRAT: 56 ML/MIN
GLUCOSE SERPL-MCNC: 223 MG/DL (ref 70–105)
HCT VFR BLD CALC: 45 % (ref 40–54)
HGB BLD-MCNC: 15.1 G/DL (ref 13.3–17.7)
LYMPHOCYTES # BLD AUTO: 0.4 10^3/UL (ref 1–4)
LYMPHOCYTES NFR BLD AUTO: 5 % (ref 12–44)
MAGNESIUM SERPL-MCNC: 2.2 MG/DL (ref 1.6–2.4)
MANUAL DIFFERENTIAL PERFORMED BLD QL: NO
MCH RBC QN AUTO: 31 PG (ref 25–34)
MCHC RBC AUTO-ENTMCNC: 33 G/DL (ref 32–36)
MCV RBC AUTO: 93 FL (ref 80–99)
MONOCYTES # BLD AUTO: 0.5 10^3/UL (ref 0–1)
MONOCYTES NFR BLD AUTO: 5 % (ref 0–12)
NEUTROPHILS # BLD AUTO: 7.7 10^3/UL (ref 1.8–7.8)
NEUTROPHILS NFR BLD AUTO: 89 % (ref 42–75)
PHOSPHATE SERPL-MCNC: 4.6 MG/DL (ref 2.3–4.7)
PLATELET # BLD: 178 10^3/UL (ref 130–400)
PMV BLD AUTO: 11 FL (ref 9–12.2)
POTASSIUM SERPL-SCNC: 4.2 MMOL/L (ref 3.6–5)
SODIUM SERPL-SCNC: 136 MMOL/L (ref 135–145)
WBC # BLD AUTO: 8.7 10^3/UL (ref 4.3–11)

## 2022-11-30 RX ADMIN — SENNOSIDES SCH MG: 8.6 TABLET, FILM COATED ORAL at 08:22

## 2022-11-30 RX ADMIN — IPRATROPIUM BROMIDE AND ALBUTEROL SULFATE SCH ML: .5; 3 SOLUTION RESPIRATORY (INHALATION) at 11:00

## 2022-11-30 RX ADMIN — INSULIN ASPART SCH UNIT: 100 INJECTION, SOLUTION INTRAVENOUS; SUBCUTANEOUS at 05:49

## 2022-11-30 RX ADMIN — FUROSEMIDE SCH MG: 10 INJECTION, SOLUTION INTRAVENOUS at 05:27

## 2022-11-30 RX ADMIN — IPRATROPIUM BROMIDE AND ALBUTEROL SULFATE SCH ML: .5; 3 SOLUTION RESPIRATORY (INHALATION) at 02:31

## 2022-11-30 RX ADMIN — Medication SCH MG: at 08:22

## 2022-11-30 RX ADMIN — METHYLPREDNISOLONE SODIUM SUCCINATE SCH MG: 40 INJECTION, POWDER, FOR SOLUTION INTRAMUSCULAR; INTRAVENOUS at 05:23

## 2022-11-30 RX ADMIN — IPRATROPIUM BROMIDE AND ALBUTEROL SULFATE SCH ML: .5; 3 SOLUTION RESPIRATORY (INHALATION) at 08:21

## 2022-11-30 RX ADMIN — METOPROLOL SUCCINATE SCH MG: 100 TABLET, EXTENDED RELEASE ORAL at 08:22

## 2022-11-30 RX ADMIN — MAGNESIUM SULFATE IN DEXTROSE SCH MLS/HR: 10 INJECTION, SOLUTION INTRAVENOUS at 08:27

## 2022-11-30 RX ADMIN — METHYLPREDNISOLONE SODIUM SUCCINATE SCH MG: 40 INJECTION, POWDER, FOR SOLUTION INTRAMUSCULAR; INTRAVENOUS at 11:33

## 2022-11-30 RX ADMIN — INSULIN ASPART SCH UNIT: 100 INJECTION, SOLUTION INTRAVENOUS; SUBCUTANEOUS at 11:33

## 2022-11-30 RX ADMIN — ENOXAPARIN SODIUM SCH MG: 100 INJECTION SUBCUTANEOUS at 05:24

## 2022-11-30 RX ADMIN — METHYLPREDNISOLONE SODIUM SUCCINATE SCH MG: 40 INJECTION, POWDER, FOR SOLUTION INTRAMUSCULAR; INTRAVENOUS at 00:12

## 2022-11-30 RX ADMIN — POTASSIUM CHLORIDE SCH MEQ: 1500 TABLET, EXTENDED RELEASE ORAL at 08:26

## 2022-11-30 RX ADMIN — HYDROCODONE BITARTRATE AND ACETAMINOPHEN PRN EA: 5; 325 TABLET ORAL at 08:21

## 2022-11-30 RX ADMIN — POTASSIUM CHLORIDE SCH MLS/HR: 200 INJECTION, SOLUTION INTRAVENOUS at 08:26

## 2022-11-30 RX ADMIN — ASPIRIN 81 MG CHEWABLE TABLET SCH MG: 81 TABLET CHEWABLE at 08:22

## 2022-11-30 RX ADMIN — DOCUSATE SODIUM SCH MG: 100 CAPSULE ORAL at 08:21

## 2022-11-30 NOTE — PHYSICAL THERAPY EVALUATION
PT Evaluation-General


Medical Diagnosis


Admission Date


Nov 27, 2022 at 14:15


Medical Diagnosis:  Shortness of Breath, LE edema


Onset Date:  Nov 27, 2022





Therapy Diagnosis


Therapy Diagnosis:  Gait deficit





Precautions


Precautions/Isolations:  Fall Prevention, Standard Precautions





Weight Bear Status


Right Lower Extremity:  Right


Full Weight Bearing


Left Lower Extremity:  Left


Full Weight Bearing





Referral


Physician:  Dr. Rivera


Reason for Referral:  Evaluation/Treatment





Medical History


Pertinent Medical History:  COPD, Heart Failure, HTN


Reviewed History:  Yes





Social History


Home:  Single Level


Current Living Status:  Alone


Entry Into Home:  Stairs With Railing


PT Steps Into Home:  4





Prior


Prior Level of Function


SCALE: Activities may be completed with or without assistive devices.





6-Indepedent-patient completes the activity by him/herself with no assistance 

from a helper.


5-Set-up or Clean-up Assistance-helper sets up or cleans up; patient completes 

activity. [place] assists only prior to or  


    following the activity.


4-Supervision or Touching Assistance-helper provides verbal cues and/or 

touching/steadying and/or contact guard assistance as patient completes 

activity. Assistance may be provided   


    throughout the activity or intermittently.


3-Partial/Moderate Assistance-helper does LESS THAN HALF the effort. [place] 

lifts, holds or supports trunk or limbs, but provides less than half the effort.


2-Substantial/Maximal Assistance-helper does MORE THAN HALF the effort. [place] 

lifts or holds trunk or limbs and provides more than half the effort.


5-Yqmgrwzog-ingmay does ALL the effort. Patient does none of the effort to 

complete the activity. Or, the assistance of 2 or more helpers is required for 

the patient to complete the  


    activity.


If activity was not attempted, code reason:


7-Patient Refused.


9-Not Applicable-not attempted and the patient did not perform the activity 

before the current illness, exacerbation or injury.


10-Not Attempted due to Environmental Limitations-(lack of equipment, weather 

restraints, etc.).


88-Not Attempted due to Medical Conditions or Safety Concerns.


Bed Mobility:  6


Transfers (B,C,W/C):  6


Gait:  6


Stairs:  6


Indoor Mobility (Ambulation):  Independent


Stairs:  Independent


Prior Devices Use:  None





PT Evaluation-Current


Subjective


Patient sitting in chair upon PT arrival, agreeable to treatment.  Patient 

performs all observed transfers with [place].  Patient ambulates 200 feet 

with complete independence and no assistive device.  Patient ascends/descends 4 

steps with SBA and bilateral handrails.  Patient ambulates back to room and is 

up in the chair post treatment with all needs met, nursing notified, call light 

in hand.





Objective


Patient Orientation:  Person, Place, Time, Situation


Attachments:  Oxygen





ROM/Strength


ROM Lower Extremities


WFLs bilaterally all planes


Strength Lower Extremities


5/5 bilaterally all planes





Sensory


Vision:  Functional


Hearing:  Functional


Sensation Right Lower Extremit:  Intact


Sensation Left Lower Extremity:  Intact





Transfers


Sit to Stand (QC):  6


Chair/Bed-to-Chair Xfer(QC):  6





Gait


Does the Patient Walk?:  Yes


Mode of Locomotion:  Walk


Anticipated Mode of Locomotion:  Walk


Walk 10 feet (QC):  6


Walk 50 ft with 2 Turns(QC):  6


Walk 150 ft (QC):  6


Distance:  200


Gait Assistive Device:  None





Stairs


#of Steps:  4


1 Step (curb) (QC):  4


4 Steps (QC):  4





Balance


Sitting Static:  Normal


Sitting Dynamic:  Normal


Standing Static:  Good


 Standing Dynamic:  Good





Assessment/Needs


Patient tolerated evaluation and treatment well.  Demonstrates [place] with

all observed transfers.  Patient ambulates 200 feet with no AD, with 

independence.   Patient ascends/descends 4 steps with [place], with bilat

eral handrails.   Patient in chair post treatment with all needs met, nursing 

notified, call light in reach.


Rehab Potential:  Good





PT Plan


Problem List


Problem List:  Activity Tolerance, Safety





Treatment/Plan


Treatment Plan:  Discontinue PT


Treatment Duration:  Dec 2, 2022


Frequency:  


Estimated Hrs Per Day:  Other


Patient and/or Family Agrees t:  Yes





Safety Risks/Education


Patient Education:  Gait Training, Transfer Techniques, Steps


Teaching Recipient:  Patient


Teaching Methods:  Demonstration, Discussion


Response to Teaching:  Verbalize Understanding, Return Demonstration





Discharge Recommendations


Target Placement


Home with assistance as needed.





Time


Time In:  1030


Time Out:  1048


DATE:  Nov 30, 2022


Total Billed Treatment Time:  18


Total Billed Treatment


Visit, LEILA Ramires PT                Nov 30, 2022 10:56

## 2022-11-30 NOTE — DISCHARGE INST-SIMPLE/STANDARD
Discharge Inst-Standard


Discharge Medications


New, Converted or Re-Newed RX:  Transmitted to Pharmacy





Patient Instructions/Follow Up


Plan of Care/Instructions/FU:  


Please continue to take your medications as written. Please follow up with


your primary care doctor to follow up this hospital stay.


Activity as Tolerated:  Yes


Discharge Diet:  Low Sodium Diet


Return to The Hospital For:  


Chest pain, shortness of breath, fever, weakness, if you feel you are


getting worse.











KARLI MITCHELL MD              Nov 30, 2022 10:42

## 2024-01-08 NOTE — DISCHARGE SUMMARY
Diagnosis/Chief Complaint


Date of Admission


Nov 27, 2022 at 2:15 pm


Date of Discharge





Discharge Date:  Nov 30, 2022


Admission Diagnosis


Acute respiratory failure with hypoxia and hypercapnia


Primary Care


No,Local Physician


Discharge Diagnosis





(1) Acute respiratory failure with hypoxia and hypercapnia


Status:  Acute


(2) CHF (congestive heart failure)


Status:  Acute


(3) COPD (chronic obstructive pulmonary disease)


Status:  Acute


(4) Super obesity


Status:  Chronic


(5) HTN (hypertension)


Status:  Acute


(6) Hyperglycemia


Status:  Acute


(7) Sleep apnea


Status:  Acute





Discharge Summary


Discharge Physical Exam


Allergies:  


Coded Allergies:  


     No Known Drug Allergies (Unverified , 11/27/22)


Vitals & I&Os





Vital Signs








  Date Time  Temp Pulse Resp B/P (MAP) Pulse Ox O2 Delivery O2 Flow Rate FiO2


 


11/30/22 08:21     92 Nasal Cannula 2.00 


 


11/30/22 08:00 36.8 87 20 138/83 (101)    


 


11/29/22 04:22        40











Hospital Course


Labs (last 24 hrs)


Laboratory Tests


11/29/22 10:52: Glucometer 306H


11/29/22 16:36: Glucometer 276H


11/29/22 20:50: Glucometer 316H


11/30/22 05:33: Glucometer 252H


11/30/22 06:50: 


White Blood Count 8.7, Red Blood Count 4.84, Hemoglobin 15.1, Hematocrit 45, 

Mean Corpuscular Volume 93, Mean Corpuscular Hemoglobin 31, Mean Corpuscular 

Hemoglobin Concent 33, Red Cell Distribution Width 13.3, Platelet Count 178, 

Mean Platelet Volume 11.0, Immature Granulocyte % (Auto) 1, Neutrophils (%) 

(Auto) 89H, Lymphocytes (%) (Auto) 5L, Monocytes (%) (Auto) 5, Eosinophils (%) 

(Auto) 0, Basophils (%) (Auto) 0, Neutrophils # (Auto) 7.7, Lymphocytes # (Auto)

0.4L, Monocytes # (Auto) 0.5, Eosinophils # (Auto) 0.0, Basophils # (Auto) 0.0, 

Immature Granulocyte # (Auto) 0.1, Sodium Level 136, Potassium Level 4.2, 

Chloride Level 98, Carbon Dioxide Level 27, Anion Gap 11, Blood Urea Nitrogen 

33H, Creatinine 1.52H, Estimat Glomerular Filtration Rate 56, BUN/Creatinine 

Ratio 22, Glucose Level 223H, Calcium Level 8.7, Phosphorus Level 4.6, Magnesium

Level 2.2





Microbiology


11/27/22 MRSA Screen - Final, Complete


           MRSA not isolated


11/27/22 Urine Culture - Final, Complete


           Gram Pos Mixed Bacterial Lori


Patient resulted labs reviewed.


Pending Labs


Laboratory Tests


11/30/22 05:33: Glucometer 252


11/30/22 06:50: 


White Blood Count 8.7, Red Blood Count 4.84, Hemoglobin 15.1, Hematocrit 45, 

Mean Corpuscular Volume 93, Mean Corpuscular Hemoglobin 31, Mean Corpuscular 

Hemoglobin Concent 33, Red Cell Distribution Width 13.3, Platelet Count 178, 

Mean Platelet Volume 11.0, Immature Granulocyte % (Auto) 1, Neutrophils (%) 

(Auto) 89, Lymphocytes (%) (Auto) 5, Monocytes (%) (Auto) 5, Eosinophils (%) 

(Auto) 0, Basophils (%) (Auto) 0, Neutrophils # (Auto) 7.7, Lymphocytes # (Auto)

0.4, Monocytes # (Auto) 0.5, Eosinophils # (Auto) 0.0, Basophils # (Auto) 0.0, 

Immature Granulocyte # (Auto) 0.1, Sodium Level 136, Potassium Level 4.2, 

Chloride Level 98, Carbon Dioxide Level 27, Anion Gap 11, Blood Urea Nitrogen 

33, Creatinine 1.52, Estimat Glomerular Filtration Rate 56, BUN/Creatinine Ratio

22, Glucose Level 223, Calcium Level 8.7, Phosphorus Level 4.6, Magnesium Level 

2.2





Imaging:  Reviewed Imaging Films, Reviewed Imaging Report





Discharge


Home Medications:





Active Scripts


Active


Metformin HCl 500 Mg Tablet 500 Mg PO BID


Prednisone 20 Mg Tab 40 Mg PO DAILY


Triamterene-Hctz 37.5-25 mg Cp (Triamterene/Hydrochlorothiazid) 37.5 Mg-25 Mg 

Capsule 1 Each PO DAILY


Children's Aspirin (Aspirin) 81 Mg Tab.chew 81 Mg PO DAILY


Amlodipine Besylate 10 Mg Tablet 10 Mg PO DAILY


Metoprolol Succinate 100 Mg Tab.er.24h 200 Mg PO HS


Metoprolol Succinate 100 Mg Tab.er.24h 200 Mg PO DAILY





Instructions to patient/family


Please see electronic discharge instructions given to patient.





Problem Qualifiers





(1) Sleep apnea:  


Sleep apnea type:  unspecified type  Qualified Codes:  G47.30 - Sleep apnea, 

unspecified








KARLI MITCHELL MD             Nov 30, 2022 10:46 am
36.9

## 2024-04-29 NOTE — PROGRESS NOTE - CARDIOLOGY
Cardiology SOAP Progress Note


Subjective:


Sitting up in recliner at the bedside


States he feels good today


Wants to go home





Objective:


I&O/Vital Signs











 11/30/22 11/30/22 11/30/22 11/30/22





 00:12 01:00 02:31 04:00


 


Temp 36.6   36.4


 


Pulse 84 88 88 81


 


Resp 20  19 20


 


B/P (MAP) 138/70 (92)   156/98 (117)


 


Pulse Ox 93  96 98


 


O2 Delivery Room Air   NIV Bilevel


 


O2 Flow Rate   40.00 


 


    





 11/30/22 11/30/22 11/30/22 11/30/22





 07:00 08:00 08:00 08:21


 


Temp   36.8 


 


Pulse 89  87 


 


Resp   20 


 


B/P (MAP)   138/83 (101) 


 


Pulse Ox  92 97 92


 


O2 Delivery  Nasal Cannula Room Air Nasal Cannula


 


O2 Flow Rate  2.00  2.00


 


    





 11/30/22   





 11:01   


 


Pulse Ox 94   


 


O2 Delivery Nasal Cannula   


 


O2 Flow Rate 2.00   














 11/30/22





 00:00


 


Intake Total 1450 ml


 


Output Total 1050 ml


 


Balance 400 ml








Constitutional:  AAO x 3, well-developed, well-nourished, other (pbese)


Respiratory:  No accessory muscle use; other (good, bilat air entry)


Cardiovascular:  regular rate-rhythm, S1 and S2, systolic murmur (soft POORNIMA at 

card base)


Gastrointestional:  No tender; soft; No guarding, No rebound; audible bowel 

sounds


Extremities:  swelling (mild to mod, bilateral leg edema); No clubbing, No 

cyanosis


Neurologic/Psychiatric:  oriented x 3, other (moves all limbs equally)


Skin:  No rash on exposed areas, No ulcerations on exposed areas





Results/Procedures:


Labs


Laboratory Tests


11/29/22 16:36: Glucometer 276H


11/29/22 20:50: Glucometer 316H


11/30/22 05:33: Glucometer 252H


11/30/22 06:50: 


White Blood Count 8.7, Red Blood Count 4.84, Hemoglobin 15.1, Hematocrit 45, 

Mean Corpuscular Volume 93, Mean Corpuscular Hemoglobin 31, Mean Corpuscular 

Hemoglobin Concent 33, Red Cell Distribution Width 13.3, Platelet Count 178, 

Mean Platelet Volume 11.0, Immature Granulocyte % (Auto) 1, Neutrophils (%) 

(Auto) 89H, Lymphocytes (%) (Auto) 5L, Monocytes (%) (Auto) 5, Eosinophils (%) 

(Auto) 0, Basophils (%) (Auto) 0, Neutrophils # (Auto) 7.7, Lymphocytes # (Auto)

0.4L, Monocytes # (Auto) 0.5, Eosinophils # (Auto) 0.0, Basophils # (Auto) 0.0, 

Immature Granulocyte # (Auto) 0.1, Sodium Level 136, Potassium Level 4.2, 

Chloride Level 98, Carbon Dioxide Level 27, Anion Gap 11, Blood Urea Nitrogen 

33H, Creatinine 1.52H, Estimat Glomerular Filtration Rate 56, BUN/Creatinine 

Ratio 22, Glucose Level 223H, Calcium Level 8.7, Phosphorus Level 4.6, Magnesium

Level 2.2


11/30/22 10:51: Glucometer 277H





Microbiology


11/27/22 MRSA Screen - Final, Complete


           MRSA not isolated


11/27/22 Urine Culture - Final, Complete


           Gram Pos Mixed Bacterial Lori





Laboratory Tests


11/29/22 05:15








11/30/22 06:50











A/P:


Assessment:





Ac diastolic CHF, likely due to hypertensive CVD


- echo on 11/28/22: LVEF 50-55%, mild biatrial enlargement





Severe htn and HCVD





Obstructive sleep apnea


- noncompliant with treatment





Obesity with suspected obesity-hypoventilation syndrome


Plan:





* BP well controlled with multi-drug regimen 


   * continue current regimen


* Advised cessation of tobacco use


* Advised compliance with CPAP out pt


* Outpt cardiac f/u advised











KAMAR JAMES           Nov 30, 2022 11:46 yes